# Patient Record
Sex: FEMALE | Race: BLACK OR AFRICAN AMERICAN | NOT HISPANIC OR LATINO | Employment: FULL TIME | URBAN - METROPOLITAN AREA
[De-identification: names, ages, dates, MRNs, and addresses within clinical notes are randomized per-mention and may not be internally consistent; named-entity substitution may affect disease eponyms.]

---

## 2018-06-11 ENCOUNTER — APPOINTMENT (OUTPATIENT)
Dept: RADIOLOGY | Facility: MEDICAL CENTER | Age: 38
End: 2018-06-11
Attending: EMERGENCY MEDICINE
Payer: COMMERCIAL

## 2018-06-11 ENCOUNTER — HOSPITAL ENCOUNTER (EMERGENCY)
Facility: MEDICAL CENTER | Age: 38
End: 2018-06-11
Attending: EMERGENCY MEDICINE
Payer: COMMERCIAL

## 2018-06-11 VITALS
HEIGHT: 59 IN | DIASTOLIC BLOOD PRESSURE: 61 MMHG | HEART RATE: 73 BPM | OXYGEN SATURATION: 96 % | SYSTOLIC BLOOD PRESSURE: 98 MMHG | WEIGHT: 165 LBS | RESPIRATION RATE: 16 BRPM | BODY MASS INDEX: 33.26 KG/M2 | TEMPERATURE: 97.6 F
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
## 2018-06-11 DIAGNOSIS — M54.50 ACUTE MIDLINE LOW BACK PAIN WITHOUT SCIATICA: ICD-10-CM

## 2018-06-11 PROCEDURE — 72100 X-RAY EXAM L-S SPINE 2/3 VWS: CPT

## 2018-06-11 PROCEDURE — 99284 EMERGENCY DEPT VISIT MOD MDM: CPT

## 2018-06-11 PROCEDURE — A9270 NON-COVERED ITEM OR SERVICE: HCPCS | Performed by: EMERGENCY MEDICINE

## 2018-06-11 PROCEDURE — 700102 HCHG RX REV CODE 250 W/ 637 OVERRIDE(OP): Performed by: EMERGENCY MEDICINE

## 2018-06-11 RX ORDER — IBUPROFEN 600 MG/1
600 TABLET ORAL ONCE
Status: COMPLETED | OUTPATIENT
Start: 2018-06-11 | End: 2018-06-11

## 2018-06-11 RX ORDER — ACETAMINOPHEN 325 MG/1
650 TABLET ORAL ONCE
Status: COMPLETED | OUTPATIENT
Start: 2018-06-11 | End: 2018-06-11

## 2018-06-11 RX ADMIN — IBUPROFEN 600 MG: 600 TABLET, FILM COATED ORAL at 20:30

## 2018-06-11 RX ADMIN — ACETAMINOPHEN 650 MG: 325 TABLET, FILM COATED ORAL at 20:30

## 2018-06-11 ASSESSMENT — PAIN SCALES - GENERAL
PAINLEVEL_OUTOF10: 5
PAINLEVEL_OUTOF10: 9

## 2018-06-12 NOTE — ED TRIAGE NOTES
Pt is not a fall risk per [employer] scale.  Provided call light and instructions to call for assistance.  Pt verbalizes understanding.
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
## 2019-03-05 ENCOUNTER — OFFICE VISIT (OUTPATIENT)
Dept: URGENT CARE | Facility: PHYSICIAN GROUP | Age: 39
End: 2019-03-05
Payer: COMMERCIAL

## 2019-03-05 VITALS
WEIGHT: 151 LBS | HEIGHT: 59 IN | TEMPERATURE: 98.4 F | OXYGEN SATURATION: 96 % | HEART RATE: 78 BPM | BODY MASS INDEX: 30.44 KG/M2 | DIASTOLIC BLOOD PRESSURE: 80 MMHG | RESPIRATION RATE: 14 BRPM | SYSTOLIC BLOOD PRESSURE: 132 MMHG

## 2019-03-05 DIAGNOSIS — G56.03 BILATERAL CARPAL TUNNEL SYNDROME: ICD-10-CM

## 2019-03-05 PROCEDURE — 99204 OFFICE O/P NEW MOD 45 MIN: CPT | Performed by: FAMILY MEDICINE

## 2019-03-05 NOTE — LETTER
March 5, 2019         Patient: Nav Coates   YOB: 1980   Date of Visit: 3/5/2019           To Whom it May Concern:    Nav Coates was seen in my clinic on 3/5/2019.      If you have any questions or concerns, please don't hesitate to call.        Sincerely,           Johnny Burgess M.D.  Electronically Signed

## 2019-03-06 NOTE — PROGRESS NOTES
"Chief Complaint   Patient presents with   • Hand Pain     bilat wrist/hand pain x4 months     Subjective:                hand pain          c/o intermittent sharp bilat hand and wrist pain x 4 mth.   Pain is worse with use of the hands.   Also c/o \"tingling\" in both hands ,worse at night.   She is taking tylenol, which has not been helpful.    Also c/o dec  strength           The pain does not radiate. The pain is moderate.           Past medical history was unremarkable and not pertinent to current issue  Social hx - denies tobacco, alcohol, drug use  Family hx was reviewed - no pertinent past family hx          Review of Systems   Constitutional: Negative for fever, chills and malaise/fatigue.   Eyes: Negative for vision changes, d/c.    Respiratory: Negative for cough and sputum production.    Cardiovascular: Negative for chest pain and palpitations.   Gastrointestinal: Negative for nausea, vomiting, abdominal pain, diarrhea and constipation.   Genitourinary: Negative for dysuria, urgency and frequency.   Skin: Negative for rash or  itching.   Neurological: Negative for dizziness or headaches.   Psychiatric/Behavioral: Negative for depression.   Hematologic/lymphatic - denies bruising or excessive bleeding  All other systems reviewed and are negative.         Objective:     Blood pressure 132/80, pulse 78, temperature 36.9 °C (98.4 °F), temperature source Temporal, resp. rate 14, height 1.499 m (4' 11\"), weight 68.5 kg (151 lb), SpO2 96 %.      Physical Exam   Constitutional: pt is oriented to person, place, and time. Pt appears well-developed and well-nourished. No distress.   HENT:   Head: Normocephalic and atraumatic.   Eyes: Conjunctivae are normal.   Cardiovascular: Normal rate.    Pulmonary/Chest: Effort normal.   Musculoskeletal:        bilat hands:   There is no thenar wasting.   No objective sensory loss.    strength 4/5 bilat.         normal range of motion and no crepitus.     Neurological: pt " is alert and oriented to person, place, and time.   Skin: Skin is warm. Pt is not diaphoretic. No erythema.   Nursing note and vitals reviewed.              Assessment/Plan:        1. Bilateral carpal tunnel syndrome     - REFERRAL TO NEURODIAGNOSTICS (EEG,EP,EMG/NCS/DBS) Modality Requested: EMG-Comment Extremities  - Diclofenac Sodium (VOLTAREN) 1 % Gel; Apply 4 g to skin as directed 3 times a day as needed.  Dispense: 1 Tube; Refill: 0  - REFERRAL TO HAND SURGERY

## 2019-07-18 ENCOUNTER — OCCUPATIONAL MEDICINE (OUTPATIENT)
Dept: URGENT CARE | Facility: PHYSICIAN GROUP | Age: 39
End: 2019-07-18
Payer: COMMERCIAL

## 2019-07-18 VITALS
HEIGHT: 59 IN | HEART RATE: 71 BPM | TEMPERATURE: 98 F | SYSTOLIC BLOOD PRESSURE: 118 MMHG | BODY MASS INDEX: 33.43 KG/M2 | DIASTOLIC BLOOD PRESSURE: 84 MMHG | OXYGEN SATURATION: 97 % | WEIGHT: 165.8 LBS | RESPIRATION RATE: 14 BRPM

## 2019-07-18 DIAGNOSIS — S46.919A REPETITIVE STRAIN INJURY OF UPPER EXTREMITY: Primary | ICD-10-CM
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
## 2019-07-18 DIAGNOSIS — G56.03 BILATERAL CARPAL TUNNEL SYNDROME: ICD-10-CM

## 2019-07-18 PROCEDURE — 99214 OFFICE O/P EST MOD 30 MIN: CPT | Performed by: NURSE PRACTITIONER

## 2019-07-18 RX ORDER — PREDNISONE 20 MG/1
TABLET ORAL
Qty: 14 TAB | Refills: 0 | Status: SHIPPED | OUTPATIENT
Start: 2019-07-18 | End: 2020-01-22

## 2019-07-18 ASSESSMENT — ENCOUNTER SYMPTOMS
RESPIRATORY NEGATIVE: 1
TINGLING: 1
FOCAL WEAKNESS: 0
CONSTITUTIONAL NEGATIVE: 1
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
## 2019-07-18 NOTE — LETTER
"EMPLOYEE’S CLAIM FOR COMPENSATION/ REPORT OF INITIAL TREATMENT  FORM C-4    EMPLOYEE’S CLAIM - PROVIDE ALL INFORMATION REQUESTED   First Name  Nav Last Name  Aminata Birthdate                    1980                Sex  female Claim Number   Home Address  [address] Age  39 y.o. Height  1.499 m (4' 11\") Weight  75.2 kg (165 lb 12.8 oz) [facility]     [facility] Zip  [address] Telephone  [phone] (home)    Mailing Address  [address] [place] Zip  [address] Primary Language Spoken  English    Insurer  [employer]   [employer]   Employee's Occupation (Job Title) When Injury or Occupational Disease Occurred      Employer's Name  [employer]  Telephone  [phone]    Employer Address  [address]  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 (Be specific an answer in detail. Use additional sheet if necessary)  repetition in pulling, grasping, bending copper wire   If you believe that you have an occupational disease, when did you first have knowledge of the disability and it relationship to your employment?  n/a Witnesses to the Accident  n/a      Nature of Injury or Occupational Disease  Workers' Compensation  Part(s) of Body Injured or Affected  Wrist (L) and Hand (L), Wrist (R) and Hand (R),     I certify that the above is true and correct to the best of my knowledge and that I have provided this information in order to obtain the benefits of " [place]  I hereby authorize any physician, chiropractor, surgeon, practitioner, or other person, any hospital, including [facility] or [facility], any medical service organization, any insurance company, or other institution or organization to release to each other, any medical or other information, including benefits paid or payable, pertinent to this injury or disease, except information relative to diagnosis, treatment and/or counseling for AIDS, psychological conditions, alcohol or controlled substances, for which I must give specific authorization.  A Photostat of this authorization shall be as valid as the original.     Date   Place   Employee’s Signature   THIS REPORT MUST BE COMPLETED AND MAILED WITHIN 3 WORKING DAYS OF TREATMENT   Place  [facility]  Name of Facility  [facility]   Date  7/18/2019 Diagnosis  (S46.919A) Repetitive strain injury of upper extremity  (G56.03) Bilateral carpal tunnel syndrome Is there evidence the injured employee was under the influence of alcohol and/or another controlled substance at the time of accident?   Hour  9:29 AM Description of Injury or Disease  Diagnoses of Repetitive strain injury of upper extremity and Bilateral carpal tunnel syndrome were pertinent to this visit.     Treatment  Initial visit.  Rx for prednisone.  Advised on RICE and OTC acetaminophen.  Avoid NSAIDs while on steroids.  Released to restricted duty.  Follow-up in 4 days, return sooner if symptoms worsen.  Have you advised the patient to remain off work five days or more? No   X-Ray Findings      If Yes   From Date  To Date      From information given by the employee, together with medical evidence, can you directly connect this injury or occupational disease as job incurred?    Comments:Indeterminate If No Full Duty  No Modified Duty  Yes   Is additional  "medical care by a physician indicated?  Yes If Modified Duty, Specify any Limitations / Restrictions  Per D39   Do you know of any previous injury or disease contributing to this condition or occupational disease?                              Comments:Dx carpal tunnel after initiating work   Date  7/18/2019 Print Doctor’s Name SUNITHA Shahid certify the employer’s copy of  this form was mailed on:   Address  [address] Insurer’s Use Only     [facility] Zip  [address]    Provider’s Tax ID Number  [id number] Telephone  Dept: [phone]        e-MAYRA Gamino   e-Signature: Dr. Hector Ni, Medical Director Degree  APRN        ORIGINAL-TREATING PHYSICIAN OR CHIROPRACTOR    PAGE 2-INSURER/TPA    PAGE 3-EMPLOYER    PAGE 4-EMPLOYEE             Form C-4 (rev10/07)              BRIEF DESCRIPTION OF RIGHTS AND BENEFITS  (Pursuant to [place]RS 616C.050)    Notice of Injury or Occupational Disease (Incident Report Form C-1): If an injury or occupational disease (OD) arises out of and in the  course of employment, you must provide written notice to your employer as soon as practicable, but no later than 7 days after the accident or  OD. Your employer shall maintain a sufficient supply of the required forms.    Claim for Compensation (Form C-4): If medical treatment is sought, the form C-4 is available at the place of initial treatment. A completed  \"Claim for Compensation\" (Form C-4) must be filed within 90 days after an accident or OD. The treating physician or chiropractor must,  within 3 working days after treatment, complete and mail to the employer, the employer's insurer and third-party , the Claim for  Compensation.    Medical Treatment: If you require medical treatment for your on-the-job injury or OD, you may be required to select a physician or  chiropractor from a list provided by your workers’ compensation insurer, if it has contracted with " an Organization for Managed Care (MCO) or  Preferred Provider Organization (PPO) or providers of health care. If your employer has not entered into a contract with an MCO or PPO, you  may select a physician or chiropractor from the Panel of Physicians and Chiropractors. Any medical costs related to your industrial injury or  OD will be paid by your insurer.    Temporary Total Disability (TTD): If your doctor has certified that you are unable to work for a period of at least 5 consecutive days, or 5  cumulative days in a 20-day period, or places restrictions on you that your employer does not accommodate, you may be entitled to TTD  compensation.    Temporary Partial Disability (TPD): If the wage you receive upon reemployment is less than the compensation for TTD to which you are  entitled, the insurer may be required to pay you TPD compensation to make up the difference. TPD can only be paid for a maximum of 24  months.    Permanent Partial Disability (PPD): When your medical condition is stable and there is an indication of a PPD as a result of your injury or  OD, within 30 days, your insurer must arrange for an evaluation by a rating physician or chiropractor to determine the degree of your PPD. The  amount of your PPD award depends on the date of injury, the results of the PPD evaluation and your age and wage.    Permanent Total Disability (PTD): If you are medically certified by a treating physician or chiropractor as permanently and totally disabled  and have been granted a PTD status by your insurer, you are entitled to receive monthly benefits not to exceed 66 2/3% of your average  monthly wage. The amount of your PTD payments is subject to reduction if you previously received a PPD award.    Vocational Rehabilitation Services: You may be eligible for vocational rehabilitation services if you are unable to return to the job due to a  permanent physical impairment or permanent restrictions as a result of your  injury or occupational disease.    Transportation and Per Tigre Reimbursement: You may be eligible for travel expenses and per tigre associated with medical treatment.    Reopening: You may be able to reopen your claim if your condition worsens after claim closure.    Appeal Process: If you disagree with a written determination issued by the insurer or the insurer does not respond to your request, you may  appeal to the Department of Administration, , by following the instructions contained in your determination letter. You must  appeal the determination within 70 days from the date of the determination letter at [address], [address], [place], [place]  [address], or [address], [address], [place], [place] [address]. If you disagree with the  decision, you may appeal to the  Department of Administration, . You must file your appeal within 30 days from the date of the  decision  letter at [address], [address], [place], [place] [address], or [address] [facility] [address], [place] [address]. If you  disagree with a decision of an , you may file a petition for judicial review with the District Court. You must do so within 30  days of the Appeal Officer’s decision. You may be represented by an  at your own expense or you may contact the [facility] for possible  representation.    [place]  for Injured Workers (NAIW): If you disagree with a  decision, you may request that NAIW represent you  without charge at an  Hearing. For information regarding denial of benefits, you may contact the [facility] at: [address], [address], [place] [address], [phone], or [address], [place] [address], [phone]    To File a Complaint with the Division: If you wish to file a complaint with the  of the Division of Industrial Relations  (DIR),  please contact the Workers’ Compensation Section, [address], [address], [place], [place] [address], telephone [phone]) [phone], or  [address], [address], [place], [place] [address], telephone [phone].    For assistance with Workers’ Compensation Issues: you may contact the Office of the [facility] Consumer Health Assistance, [address] [place] [address], [address], [place], [place] [address], Toll Free 1-757.179.4733, Web site: http://govcha.Critical access hospital.nv., E-mail  [email or web address] access [email or web address].                                                                                                                                                                                                                                   __________________________________________________________________                                                                   _________________                Employee Name / Signature                                                                                                                                                       Date                                                                                                                                                                                                     D-2 (rev. 10/07)

## 2019-07-18 NOTE — PROGRESS NOTES
"Subjective:     Nav Coates is a 39 y.o. female who presents for Wrist Pain (Bilateral)    DOI: 7/16/2019    Initial visit.  Patient works as a  with [employer].  Patient reports left wrist/hand and right wrist/hand pain, numbness, and tingling which has gradually been getting worse since last fall.  Patient reports she was diagnosed with bilateral carpal tunnel syndrome.  She states she has had positive EMG testing and has seen a hand surgeon who recommended surgery.  Prior therapy: Night splints, Voltaren gel, NSAIDs with minimal relief in the pain.  Patient states she has worked for [employer] since June 2018.  Denies second job.  Denies previous injury.    PMH: No pertinent past medical history to this problem except as documented in \[facility] [place] [facility]\""  MEDS: Medications were reviewed in Epic  ALLERGIES: Allergies were reviewed in Epic  SOCHX: Works as a  at [employer]   FH: No pertinent family history to this problem    Review of Systems   Constitutional: Negative.    Respiratory: Negative.    Musculoskeletal:        Bilateral hand/wrist pain   Neurological: Positive for tingling. Negative for focal weakness.        Numbness bilateral hand/wrists   All other systems reviewed and are negative.    Objective:     /84   Pulse 71   Temp 36.7 °C (98 °F)   Resp 14   Ht 1.499 m (4' 11\")   Wt 75.2 kg (165 lb 12.8 oz)   SpO2 97%   BMI 33.49 kg/m²     Physical Exam   Constitutional: She is oriented to person, place, and time. She appears well-developed and well-nourished.  Non-toxic appearance. No distress.   HENT:   Right Ear: External ear normal.   Left Ear: External ear normal.   Nose: Nose normal.   Eyes: Conjunctivae and EOM are normal.   Neck: Normal range of motion.   Cardiovascular: Normal rate and normal pulses.    Pulmonary/Chest: Effort normal. No respiratory distress.   Musculoskeletal: She exhibits no deformity.        Right wrist: She exhibits decreased range of motion and tenderness. " She exhibits no swelling, no deformity and no laceration.        Left wrist: She exhibits decreased range of motion and tenderness. She exhibits no swelling, no deformity and no laceration.        Right hand: She exhibits normal range of motion, no tenderness, normal capillary refill, no deformity, no laceration and no swelling. Decreased sensation noted. Normal strength noted.        Left hand: She exhibits normal range of motion, no tenderness, normal capillary refill, no deformity, no laceration and no swelling. Decreased sensation noted. Normal strength noted.   Positive Tinel and Phalen test bilaterally   Neurological: She is alert and oriented to person, place, and time. Coordination normal.   Hand  strong, equal   Skin: Skin is warm and dry. Capillary refill takes less than 2 seconds.   Psychiatric: She has a normal mood and affect. Her behavior is normal.     Assessment/Plan:     1. Repetitive strain injury of upper extremity    2. Bilateral carpal tunnel syndrome  - predniSONE (DELTASONE) 20 MG Tab; Take 3 tabs daily x 3 days, then 2 tabs daily x 2 days, then 1 tab on final day.  Dispense: 14 Tab; Refill: 0    Initial visit.  Rx for prednisone.  Advised on RICE and OTC acetaminophen.  Avoid NSAIDs while on steroids.  Released to restricted duty.  Follow-up in 4 days, return sooner if symptoms worsen.    Patient advised to: Return in about 4 days (around 7/22/2019).    Differential diagnosis, natural history, supportive care, and indications for immediate follow-up discussed. All questions answered. Patient agrees with the plan of care.

## 2019-07-22 ENCOUNTER — OCCUPATIONAL MEDICINE (OUTPATIENT)
Dept: URGENT CARE | Facility: PHYSICIAN GROUP | Age: 39
End: 2019-07-22
Payer: COMMERCIAL

## 2019-07-22 VITALS
BODY MASS INDEX: 33.33 KG/M2 | HEART RATE: 71 BPM | SYSTOLIC BLOOD PRESSURE: 110 MMHG | TEMPERATURE: 98.4 F | OXYGEN SATURATION: 98 % | WEIGHT: 165 LBS | RESPIRATION RATE: 16 BRPM | DIASTOLIC BLOOD PRESSURE: 82 MMHG
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
## 2019-07-22 DIAGNOSIS — S46.919A REPETITIVE STRAIN INJURY OF UPPER EXTREMITY: ICD-10-CM

## 2019-07-22 DIAGNOSIS — G56.03 BILATERAL CARPAL TUNNEL SYNDROME: ICD-10-CM

## 2019-07-22 PROCEDURE — 99213 OFFICE O/P EST LOW 20 MIN: CPT | Performed by: NURSE PRACTITIONER

## 2019-07-22 ASSESSMENT — ENCOUNTER SYMPTOMS
TINGLING: 1
CONSTITUTIONAL NEGATIVE: 1

## 2019-07-22 NOTE — LETTER
"   [facility] [address] [place] [address]  Phone:  [phone] - Fax:  [phone]   [facility] Progress Report and Disability Certification  Date of Service: 7/22/2019   No Show:  No  Date / Time of Next Visit: 8/5/2019   Claim Information   Patient Name: Nav Coates  Claim Number:     Employer: [employer]  Date of Injury: 11/18/2018     Insurer / TPA: [employer]  ID / SSN:     Occupation:   Diagnosis: Diagnoses of Bilateral carpal tunnel syndrome and Repetitive strain injury of upper extremity were pertinent to this visit.    Medical Information   Related to Industrial Injury? Yes    Subjective Complaints:  Initial visit in  7/18/2019:    \"DOI: 7/16/2019     Initial visit.  Patient works as a  with [employer].  Patient reports left   wrist/hand and right wrist/hand pain, numbness, and tingling which has gradually been   getting worse since last fall.  Patient reports she was diagnosed with bilateral carpal   tunnel syndrome.  She states she has had positive EMG testing and has seen a hand surgeon   who recommended surgery.  Prior therapy: Night splints, Voltaren gel, NSAIDs with minimal   relief in the pain.  Patient states she has worked for [employer] since June 2018.  Denies   second job.  Denies previous injury.\"    Follow-up visit today 7/22/2019:    Patient reports symptoms as unchanged.  Has not used prescription prednisone from previous   visit as she has been out of town but has been using acetaminophen.  She reports that both   of her hands and fingers are swollen.   Objective Findings: Musculoskeletal: She exhibits no deformity.        Right wrist: She exhibits decreased range of motion and tenderness.        Left wrist: She exhibits decreased range of motion and tenderness.        Right hand: She exhibits decreased range of motion and swelling. She exhibits normal capillary refill. Decreased " sensation noted.        Left hand: She exhibits decreased range of motion and swelling. She exhibits normal capillary refill. Decreased sensation noted.   Positive Tinel and Phalen test bilaterally   Skin: Skin is warm and dry. Capillary refill takes less than 2 seconds.   Pre-Existing Condition(s):     Assessment:   Condition Worsened    Status: Discharged / Care Transfer  Permanent Disability:No    Plan:      Diagnostics:      Comments:  Patient condition worsening.  Care transferred to occupational medicine.  Advised to   initiate prednisone.  Avoid NSAIDs while on steroids.  Released to restricted duty.    Follow-up with occupational medicine, but return sooner if symptoms worsen  .    Disability Information   Status: Released to Restricted Duty    From:  2019  Through: 2019 Restrictions are: Temporary   Physical Restrictions   Sitting:    Standing:    Stooping:    Bending:      Squatting:    Walking:    Climbin hrs/day Pushin hrs/day   Pullin hrs/day Other:    Reaching Above Shoulder (L):   Reaching Above Shoulder (R):       Reaching Below Shoulder (L):    Reaching Below Shoulder (R):      Not to exceed Weight Limits   Carrying(hrs): 0 Weight Limit(lb):   Lifting(hrs): 0 Weight  Limit(lb):     Comments: Bilateral upper extremities    Repetitive Actions   Hands: i.e. Fine Manipulations from [place] hrs/day   Feet: i.e. Operating Foot Controls:     Driving / Operate Machinery: 0 hrs/day   Physician Name: SUNITHA Shahid Physician Signature: MAYRA Weber e-Signature: Dr. Hector Ni, Medical Director   Clinic Name / Location: [facility] [address] [address] Clinic Phone Number: Dept: [phone]   Appointment Time: 4:40 Pm Visit Start Time: 5:07 PM   Check-In Time:  4:57 Pm Visit Discharge Time: 5:44 Pm    Original-Treating Physician or Chiropractor    Page 2-Insurer/TPA    Page 3-Employer    Page  4-Employee

## 2019-07-23 NOTE — PROGRESS NOTES
"Subjective:     Nav Coates is a 39 y.o. female who presents for Hand Injury (WC hand injury both, feeling the same)    Initial visit in  7/18/2019:    \"DOI: 7/16/2019     Initial visit.  Patient works as a  with [employer].  Patient reports left   wrist/hand and right wrist/hand pain, numbness, and tingling which has gradually been   getting worse since last fall.  Patient reports she was diagnosed with bilateral carpal   tunnel syndrome.  She states she has had positive EMG testing and has seen a hand surgeon   who recommended surgery.  Prior therapy: Night splints, Voltaren gel, NSAIDs with minimal   relief in the pain.  Patient states she has worked for [employer] since June 2018.  Denies   second job.  Denies previous injury.\"    Follow-up visit today 7/22/2019:    Patient reports symptoms as unchanged.  Has not used prescription prednisone from previous   visit as she has been out of town but has been using acetaminophen.  She reports that both   of her hands and fingers are swollen.    PMH: No pertinent past medical history to this problem except as documented in [facility]  MEDS: Medications were reviewed in Epic  ALLERGIES: Allergies were reviewed in Epic  SOCHX: Works as a  at [employer]   FH: No pertinent family history to this problem    Review of Systems   Constitutional: Negative.    Musculoskeletal:        Bilateral hand/wrist pain   Neurological: Positive for tingling.        Numbness bilateral hand/wrists   All other systems reviewed and are negative.    Objective:     /82 (BP Location: Left arm, Patient Position: Sitting, BP Cuff Size: Adult)   Pulse 71   Temp 36.9 °C (98.4 °F) (Temporal)   Resp 16   Wt 74.8 kg (165 lb)   SpO2 98%   BMI 33.33 kg/m²     Physical Exam   Constitutional: She is oriented to person, place, and time. She appears well-developed and well-nourished.  Non-toxic appearance. No distress.   HENT:   Right Ear: External ear normal.   Left Ear: " External ear normal.   Nose: Nose normal.   Eyes: Conjunctivae and EOM are normal.   Neck: Normal range of motion.   Cardiovascular: Normal rate and normal pulses.    Pulmonary/Chest: Effort normal. No respiratory distress.   Musculoskeletal: She exhibits no deformity.        Right wrist: She exhibits decreased range of motion and tenderness.        Left wrist: She exhibits decreased range of motion and tenderness.        Right hand: She exhibits decreased range of motion and swelling. She exhibits normal capillary refill. Decreased sensation noted.        Left hand: She exhibits decreased range of motion and swelling. She exhibits normal capillary refill. Decreased sensation noted.   Positive Tinel and Phalen test bilaterally   Neurological: She is alert and oriented to person, place, and time. Coordination normal.   Skin: Skin is warm and dry. Capillary refill takes less than 2 seconds.   Psychiatric: She has a normal mood and affect. Her behavior is normal.     Assessment/Plan:     1. Bilateral carpal tunnel syndrome  - REFERRAL TO OCCUPATIONAL MEDICINE    2. Repetitive strain injury of upper extremity  - REFERRAL TO OCCUPATIONAL MEDICINE    Patient condition worsening.  Care transferred to occupational medicine.  Advised to   initiate prednisone.  Avoid NSAIDs while on steroids.  Released to restricted duty.    Follow-up with occupational medicine, but return sooner if symptoms worsen  .    Patient advised to: Return for 1) Symptoms don't improve or worsen, or go to ER.    Differential diagnosis, natural history, supportive care, and indications for immediate follow-up discussed. All questions answered. Patient agrees with the plan of care.

## 2019-07-31 ENCOUNTER — TELEPHONE (OUTPATIENT)
Dept: OCCUPATIONAL MEDICINE | Facility: CLINIC | Age: 39
End: 2019-07-31

## 2019-07-31 NOTE — TELEPHONE ENCOUNTER
Reminder call for wc apt with [facility] on 08- ,..... Voicemail box is not set up ... Could not leave message

## 2020-01-10 ENCOUNTER — TELEPHONE (OUTPATIENT)
Dept: SCHEDULING | Facility: IMAGING CENTER | Age: 40
End: 2020-01-10

## 2020-01-22 ENCOUNTER — OFFICE VISIT (OUTPATIENT)
Dept: URGENT CARE | Facility: CLINIC | Age: 40
End: 2020-01-22
Payer: COMMERCIAL

## 2020-01-22 VITALS
DIASTOLIC BLOOD PRESSURE: 62 MMHG | OXYGEN SATURATION: 98 % | WEIGHT: 170 LBS | HEIGHT: 59 IN | SYSTOLIC BLOOD PRESSURE: 104 MMHG | TEMPERATURE: 98.2 F | HEART RATE: 67 BPM | RESPIRATION RATE: 16 BRPM | BODY MASS INDEX: 34.27 KG/M2

## 2020-01-22 DIAGNOSIS — R06.02 SHORTNESS OF BREATH: ICD-10-CM

## 2020-01-22 DIAGNOSIS — J45.909 MILD ASTHMA, UNSPECIFIED WHETHER COMPLICATED, UNSPECIFIED WHETHER PERSISTENT: ICD-10-CM

## 2020-01-22 PROBLEM — G56.03 BILATERAL CARPAL TUNNEL SYNDROME: Status: ACTIVE | Noted: 2019-04-10

## 2020-01-22 PROCEDURE — 99214 OFFICE O/P EST MOD 30 MIN: CPT | Mod: 25 | Performed by: NURSE PRACTITIONER

## 2020-01-22 PROCEDURE — 94640 AIRWAY INHALATION TREATMENT: CPT | Performed by: NURSE PRACTITIONER

## 2020-01-22 PROCEDURE — 93000 ELECTROCARDIOGRAM COMPLETE: CPT | Performed by: NURSE PRACTITIONER

## 2020-01-22 RX ORDER — CITALOPRAM 20 MG/1
20 TABLET ORAL DAILY
COMMUNITY
Start: 2018-07-20 | End: 2020-10-23 | Stop reason: SDUPTHER

## 2020-01-22 RX ORDER — ALBUTEROL SULFATE 2.5 MG/3ML
SOLUTION RESPIRATORY (INHALATION)
COMMUNITY
Start: 2019-06-07 | End: 2020-08-12 | Stop reason: SDUPTHER

## 2020-01-22 RX ORDER — CYCLOBENZAPRINE HCL 10 MG
TABLET ORAL
COMMUNITY
Start: 2018-06-28 | End: 2020-03-13

## 2020-01-22 RX ORDER — METHYLPREDNISOLONE 4 MG/1
TABLET ORAL
Qty: 21 TAB | Refills: 0 | Status: SHIPPED | OUTPATIENT
Start: 2020-01-22 | End: 2020-03-13

## 2020-01-22 RX ORDER — MONTELUKAST SODIUM 10 MG/1
10 TABLET ORAL DAILY
COMMUNITY
Start: 2019-06-07 | End: 2020-03-13 | Stop reason: SINTOL

## 2020-01-22 RX ORDER — IPRATROPIUM BROMIDE AND ALBUTEROL SULFATE 2.5; .5 MG/3ML; MG/3ML
3 SOLUTION RESPIRATORY (INHALATION) ONCE
Status: COMPLETED | OUTPATIENT
Start: 2020-01-22 | End: 2020-01-22

## 2020-01-22 RX ADMIN — IPRATROPIUM BROMIDE AND ALBUTEROL SULFATE 3 ML: 2.5; .5 SOLUTION RESPIRATORY (INHALATION) at 20:31

## 2020-01-22 ASSESSMENT — ENCOUNTER SYMPTOMS
TROUBLE SWALLOWING: 0
DYSPNEA ON EXERTION: 0
WHEEZING: 1
CHILLS: 0
RHINORRHEA: 0
SORE THROAT: 0
FEVER: 0
CHEST TIGHTNESS: 1
APPETITE CHANGE: 0
NAUSEA: 0
MYALGIAS: 0
DIZZINESS: 0
VOMITING: 0
PND: 0
SHORTNESS OF BREATH: 1
COUGH: 1
HOARSE VOICE: 0
WEIGHT LOSS: 0
DIFFICULTY BREATHING: 0
EYE PAIN: 0

## 2020-01-22 NOTE — LETTER
January 22, 2020         Patient: Nav Coates   YOB: 1980   Date of Visit: 1/22/2020           To Whom it May Concern:    Nav Coates was seen in my clinic on 1/22/2020. She may return to work on 1/23/2020.    If you have any questions or concerns, please don't hesitate to call.        Sincerely,           SUNITHA Sotelo  Electronically Signed

## 2020-01-23 NOTE — PROGRESS NOTES
"Subjective:   Nav Coates  is a 39 y.o. female who presents for Chest Pain (x 1 week hurts to breath inhaler is helping. Pain is not consistant. Hx of asthma )        Asthma   She complains of chest tightness, cough, shortness of breath and wheezing. There is no difficulty breathing or hoarse voice. This is a new problem. The current episode started in the past 7 days. The problem occurs constantly. The problem has been gradually worsening. The cough is non-productive. Associated symptoms include chest pain (with breathing). Pertinent negatives include no appetite change, dyspnea on exertion, fever, myalgias, nasal congestion, PND, postnasal drip, rhinorrhea, sneezing, sore throat, trouble swallowing or weight loss. Her symptoms are aggravated by occupational exposure. Her symptoms are alleviated by nothing. She reports no improvement on treatment. Her symptoms are not alleviated by beta-agonist. There are no known risk factors for lung disease. Her past medical history is significant for asthma.     Review of Systems   Constitutional: Negative for appetite change, chills, fever and weight loss.   HENT: Negative for hoarse voice, postnasal drip, rhinorrhea, sneezing, sore throat and trouble swallowing.    Eyes: Negative for pain.   Respiratory: Positive for cough, shortness of breath and wheezing.    Cardiovascular: Positive for chest pain (with breathing). Negative for dyspnea on exertion and PND.   Gastrointestinal: Negative for nausea and vomiting.   Genitourinary: Negative for hematuria.   Musculoskeletal: Negative for myalgias.   Skin: Negative for rash.   Neurological: Negative for dizziness.     No Known Allergies   Objective:   /62   Pulse 67   Temp 36.8 °C (98.2 °F)   Resp 16   Ht 1.499 m (4' 11\")   Wt 77.1 kg (170 lb)   SpO2 98%   BMI 34.34 kg/m²   Physical Exam  Vitals signs and nursing note reviewed.   Constitutional:       General: She is not in acute distress.     Appearance: She is " well-developed.   HENT:      Head: Normocephalic and atraumatic.      Right Ear: External ear normal.      Left Ear: External ear normal.      Nose: Nose normal.      Mouth/Throat:      Mouth: Mucous membranes are moist.   Eyes:      Conjunctiva/sclera: Conjunctivae normal.      Pupils: Pupils are equal, round, and reactive to light.   Cardiovascular:      Rate and Rhythm: Normal rate and regular rhythm.      Chest Wall: PMI is not displaced.      Pulses: Normal pulses.      Heart sounds: Normal heart sounds, S1 normal and S2 normal. No murmur.   Pulmonary:      Effort: Pulmonary effort is normal. No accessory muscle usage or respiratory distress.      Breath sounds: Wheezing present.   Abdominal:      General: There is no distension.      Palpations: Abdomen is soft.      Tenderness: There is no tenderness.   Musculoskeletal: Normal range of motion.   Skin:     General: Skin is warm and dry.   Neurological:      General: No focal deficit present.      Mental Status: She is alert and oriented to person, place, and time. Mental status is at baseline.      Gait: Gait (gait at baseline) normal.   Psychiatric:         Judgment: Judgment normal.           Assessment/Plan:   1. Mild asthma, unspecified whether complicated, unspecified whether persistent  - ipratropium-albuterol (DUONEB) nebulizer solution  - methylPREDNISolone (MEDROL DOSEPAK) 4 MG Tablet Therapy Pack; Follow schedule on package instructions.  Dispense: 21 Tab; Refill: 0    2. Shortness of breath  - ipratropium-albuterol (DUONEB) nebulizer solution  - EKG - Clinic Performed  Interpreted by me    Rate: Normal  Rhythm: Normal sinus   Axis: Normal  Interval/Conduction: Normal  Enlargement: None    Ischemia:      ST Segments: no acute change      T Waves: no acute change      Q Waves: none    Comparison: NONE    Clinical Impression: no acute changes and normal EKG, see scanned sheet  Lung sounds improved with breathing treatment. PO2 reassessed and adequate.      Patient and/or guardian given precautionary s/sx that mandate immediate follow up and evaluation in the ED. Advised of risks of not doing so.  Side effects of the above medications discussed.   DDX, Supportive care, and indications for immediate follow-up discussed with patient.    Instructed to return to clinic or nearest emergency department if we are not available for any change in condition, further concerns, or worsening of symptoms.    The patient and/or guardian demonstrated a good understanding and agreed with the treatment plan.    Please note that this dictation was created using voice recognition software. I have made every reasonable attempt to correct obvious errors, but I expect that there are errors of grammar and possibly content that I did not discover before finalizing the note.

## 2020-02-12 ENCOUNTER — TELEPHONE (OUTPATIENT)
Dept: SCHEDULING | Facility: IMAGING CENTER | Age: 40
End: 2020-02-12

## 2020-03-13 ENCOUNTER — OFFICE VISIT (OUTPATIENT)
Dept: MEDICAL GROUP | Facility: PHYSICIAN GROUP | Age: 40
End: 2020-03-13
Payer: COMMERCIAL

## 2020-03-13 VITALS
HEART RATE: 64 BPM | SYSTOLIC BLOOD PRESSURE: 110 MMHG | HEIGHT: 59 IN | RESPIRATION RATE: 16 BRPM | BODY MASS INDEX: 34.35 KG/M2 | TEMPERATURE: 98.2 F | OXYGEN SATURATION: 97 % | WEIGHT: 170.4 LBS | DIASTOLIC BLOOD PRESSURE: 78 MMHG

## 2020-03-13 DIAGNOSIS — Z23 NEED FOR VACCINATION: ICD-10-CM

## 2020-03-13 DIAGNOSIS — F90.0 ATTENTION DEFICIT HYPERACTIVITY DISORDER (ADHD), PREDOMINANTLY INATTENTIVE TYPE: ICD-10-CM

## 2020-03-13 DIAGNOSIS — F33.42 MAJOR DEPRESSIVE DISORDER, RECURRENT EPISODE, IN FULL REMISSION (HCC): ICD-10-CM

## 2020-03-13 DIAGNOSIS — J45.40 MODERATE PERSISTENT ASTHMA WITHOUT COMPLICATION: Primary | ICD-10-CM

## 2020-03-13 DIAGNOSIS — Z12.31 ENCOUNTER FOR SCREENING MAMMOGRAM FOR MALIGNANT NEOPLASM OF BREAST: ICD-10-CM

## 2020-03-13 PROBLEM — G56.03 BILATERAL CARPAL TUNNEL SYNDROME: Status: RESOLVED | Noted: 2019-04-10 | Resolved: 2020-03-13

## 2020-03-13 PROCEDURE — 90686 IIV4 VACC NO PRSV 0.5 ML IM: CPT | Performed by: FAMILY MEDICINE

## 2020-03-13 PROCEDURE — 99214 OFFICE O/P EST MOD 30 MIN: CPT | Mod: 25 | Performed by: FAMILY MEDICINE

## 2020-03-13 PROCEDURE — 90471 IMMUNIZATION ADMIN: CPT | Performed by: FAMILY MEDICINE

## 2020-03-13 RX ORDER — ALBUTEROL SULFATE 90 UG/1
2 AEROSOL, METERED RESPIRATORY (INHALATION) EVERY 4 HOURS PRN
Qty: 2 INHALER | Refills: 3 | Status: SHIPPED | OUTPATIENT
Start: 2020-03-13 | End: 2021-06-21

## 2020-03-13 RX ORDER — FLUTICASONE FUROATE 100 UG/1
1 POWDER RESPIRATORY (INHALATION) DAILY
Qty: 1 EACH | Refills: 1 | Status: SHIPPED | OUTPATIENT
Start: 2020-03-13 | End: 2020-08-12

## 2020-03-13 ASSESSMENT — PATIENT HEALTH QUESTIONNAIRE - PHQ9
8. MOVING OR SPEAKING SO SLOWLY THAT OTHER PEOPLE COULD HAVE NOTICED. OR THE OPPOSITE, BEING SO FIGETY OR RESTLESS THAT YOU HAVE BEEN MOVING AROUND A LOT MORE THAN USUAL: NOT AT ALL
9. THOUGHTS THAT YOU WOULD BE BETTER OFF DEAD, OR OF HURTING YOURSELF: NOT AT ALL
5. POOR APPETITE OR OVEREATING: NOT AT ALL
3. TROUBLE FALLING OR STAYING ASLEEP OR SLEEPING TOO MUCH: NOT AT ALL
SUM OF ALL RESPONSES TO PHQ9 QUESTIONS 1 AND 2: 0
7. TROUBLE CONCENTRATING ON THINGS, SUCH AS READING THE NEWSPAPER OR WATCHING TELEVISION: NOT AT ALL
6. FEELING BAD ABOUT YOURSELF - OR THAT YOU ARE A FAILURE OR HAVE LET YOURSELF OR YOUR FAMILY DOWN: NOT AL ALL
SUM OF ALL RESPONSES TO PHQ QUESTIONS 1-9: 0
1. LITTLE INTEREST OR PLEASURE IN DOING THINGS: NOT AT ALL
4. FEELING TIRED OR HAVING LITTLE ENERGY: NOT AT ALL
2. FEELING DOWN, DEPRESSED, IRRITABLE, OR HOPELESS: NOT AT ALL

## 2020-03-13 NOTE — ASSESSMENT & PLAN NOTE
This is a chronic condition. Her symptoms are usually triggered by allergies. Symptoms are only triggered at work because of all the dust.  Onset: Symptoms present since age 5  Symptoms include:  Cough: yes  Wheezing: yes  Details: diffuse  Problems with exercise induced coughing, wheezing, or shortness of breath?  No  Has sleep been disturbed due to symptoms: No  How often have you had to use your albuterol for relief of symptoms?  daily  Current medications: montelukast 10 mg - drowsiness so not taking regularly, albuterol as needed    She has been on QVAR in the past without much relief.

## 2020-03-13 NOTE — ASSESSMENT & PLAN NOTE
This is a chronic condition.  Onset: 2015  PHQ screen: 0  Suicidal ideation/homicidal ideation: no/no  Therapy/counseling: in past, not currently  Medications: citalopram 20 mg daily  Improving/worsening: well-controlled

## 2020-03-13 NOTE — ASSESSMENT & PLAN NOTE
This is a chronic condition. She does get some ADHD but she is able to help herself focus and control it with meditation, deep breathing, self care, and other techniques.

## 2020-04-29 ENCOUNTER — HOSPITAL ENCOUNTER (OUTPATIENT)
Dept: RADIOLOGY | Facility: MEDICAL CENTER | Age: 40
End: 2020-04-29
Payer: COMMERCIAL

## 2020-05-04 ENCOUNTER — HOSPITAL ENCOUNTER (OUTPATIENT)
Dept: RADIOLOGY | Facility: MEDICAL CENTER | Age: 40
End: 2020-05-04
Payer: COMMERCIAL

## 2020-06-04 ENCOUNTER — HOSPITAL ENCOUNTER (OUTPATIENT)
Dept: RADIOLOGY | Facility: MEDICAL CENTER | Age: 40
End: 2020-06-04
Attending: FAMILY MEDICINE
Payer: COMMERCIAL

## 2020-06-04 DIAGNOSIS — Z12.31 ENCOUNTER FOR SCREENING MAMMOGRAM FOR MALIGNANT NEOPLASM OF BREAST: ICD-10-CM

## 2020-06-04 PROCEDURE — 77067 SCR MAMMO BI INCL CAD: CPT

## 2020-06-05 ENCOUNTER — TELEPHONE (OUTPATIENT)
Dept: MEDICAL GROUP | Facility: PHYSICIAN GROUP | Age: 40
End: 2020-06-05

## 2020-06-05 NOTE — TELEPHONE ENCOUNTER
Phone Number Called: [phone] (home)     Call outcome: Did not leave a detailed message. Requested patient to call back.    Message: results

## 2020-06-05 NOTE — TELEPHONE ENCOUNTER
----- Message from Sarah Ramírez M.D. sent at 6/4/2020  3:20 PM PDT -----  Your recent mammogram showed your breasts are heterogeneously dense which may obscure small masses but there were no obvious signs of cancer.  Thank you,  Sarah Ramírez MD

## 2020-06-08 NOTE — TELEPHONE ENCOUNTER
Phone Number Called: [phone] (home)     Call outcome: Spoke to patient regarding message below.    Message: patient understood no questions at this time

## 2020-08-12 ENCOUNTER — OFFICE VISIT (OUTPATIENT)
Dept: MEDICAL GROUP | Facility: PHYSICIAN GROUP | Age: 40
End: 2020-08-12
Payer: COMMERCIAL

## 2020-08-12 ENCOUNTER — NURSE TRIAGE (OUTPATIENT)
Dept: HEALTH INFORMATION MANAGEMENT | Facility: OTHER | Age: 40
End: 2020-08-12

## 2020-08-12 VITALS
TEMPERATURE: 98.2 F | HEIGHT: 59 IN | RESPIRATION RATE: 16 BRPM | SYSTOLIC BLOOD PRESSURE: 112 MMHG | BODY MASS INDEX: 33.43 KG/M2 | WEIGHT: 165.8 LBS | HEART RATE: 74 BPM | DIASTOLIC BLOOD PRESSURE: 80 MMHG | OXYGEN SATURATION: 97 %

## 2020-08-12 DIAGNOSIS — J45.40 MODERATE PERSISTENT ASTHMA WITHOUT COMPLICATION: ICD-10-CM

## 2020-08-12 PROCEDURE — 99214 OFFICE O/P EST MOD 30 MIN: CPT | Performed by: FAMILY MEDICINE

## 2020-08-12 RX ORDER — ALBUTEROL SULFATE 2.5 MG/3ML
2.5 SOLUTION RESPIRATORY (INHALATION) EVERY 4 HOURS PRN
Qty: 30 BULLET | Refills: 2 | Status: SHIPPED | OUTPATIENT
Start: 2020-08-12

## 2020-08-12 RX ORDER — PREDNISONE 20 MG/1
40 TABLET ORAL DAILY
Qty: 10 TAB | Refills: 0 | Status: SHIPPED | OUTPATIENT
Start: 2020-08-12 | End: 2020-08-17

## 2020-08-12 NOTE — TELEPHONE ENCOUNTER
Regarding: SHORTNESS OF BREATH AND WHEEZING  ----- Message from Tj Aleman Ass't sent at 8/12/2020  8:52 AM PDT -----  PT HAS SHORTNESS OF BREATH AND WHEEZING DUE TO ASTHMA.  TRANSFERRED TO NURSE. FREDA

## 2020-08-12 NOTE — TELEPHONE ENCOUNTER
Has nebulizer at home, took 3 vials last night, vials  last month, need new refill, feeling uncomfortable w/SOB.      1. Caller Name: Nav Coates                 Call Back Number: [phone]  [facility] PCP or Specialty Provider: Yes         2.  In the last two weeks, has the patient had any new or worsening symptoms (not explained by alternative diagnosis)? No, since  caller having problems w/her asthma related to change in work environment that is [employer] & need to wear a mask because of covid, all this has contributed to increased problems w/allergies & asthma, had fluid on lung in  & went to  & given breathing treatment & 6 day steroid pack.      3.  Does patient have any comoribidities? Has had allergies & asthma her entire life.      4.  Has the patient traveled in the last 14 days OR had any known contact with someone who is suspected or confirmed to have COVID-19?        Symptoms are not new, allergy & asthma related symptoms.

## 2020-08-12 NOTE — LETTER
August 12, 2020      To Whomever It May Concern RE:  Nav Torrestle    Due to her asthma, she has much difficulty with the dust causing asthma exacerbations. She states the different masks make trouble breathing worse. She has purchased a [employer] ST-AX reusable dust half respirator. This works very well to keep dust out and will limit her asthma exacerbations. While there is no information specifically for COVID for this respirator, it does use a cotton filter, which presumably is similar to the fabric masks being made for this pandemic and will likely provide some protection for COVID. I am requesting you allow her to wear this respirator at work.     Thank you,  Sarah Ramírez M.D.

## 2020-08-12 NOTE — ASSESSMENT & PLAN NOTE
This is a chronic condition. Her asthma is unchanged since our last appointment. Being around the dust causes an asthma exacerbation. She is around dust more often than not while at work. She is not taking the Arnuity Ellipta I prescribed due to cost. She would like to try Dulera instead as that was cost effective in the past. She had been taking singulair and albuterol. She has been using the albuterol 4-5 times/day. She will take 4 puffs at a time. She used the nebulizer the last 2 nights. She does need a refill of the nebulizer albuterol because it has .     She reports she has to wear a mask 12 hours a day. While she wears the mask, with the dust, and fan, she has trouble breathing. She needs a doctor's note stating she has asthma so her work can work with her to find a mask that works better.

## 2020-08-12 NOTE — PROGRESS NOTES
Subjective:     CC: asthma    HPI:   Nav presents today with     Moderate persistent asthma  This is a chronic condition. Her asthma is unchanged since our last appointment. Being around the dust causes an asthma exacerbation. She is around dust more often than not while at work. She is not taking the Arnuity Ellipta I prescribed due to cost. She would like to try Dulera instead as that was cost effective in the past. She had been taking singulair and albuterol. She has been using the albuterol 4-5 times/day. She will take 4 puffs at a time. She used the nebulizer the last 2 nights. She does need a refill of the nebulizer albuterol because it has .     She reports she has to wear a mask 12 hours a day. While she wears the mask, with the dust, and fan, she has trouble breathing. She needs a doctor's note stating she has asthma so her work can work with her to find a mask that works better.       History reviewed. No pertinent past medical history.    Social History     Tobacco Use   • Smoking status: Never Smoker   • Smokeless tobacco: Never Used   Substance Use Topics   • Alcohol use: No   • Drug use: No       Current Outpatient Medications Ordered in Epic   Medication Sig Dispense Refill   • Mometasone Furo-Formoterol Fum 100-5 MCG/ACT Aerosol Inhale 2 Puffs by mouth every 12 hours. 1 Each 4   • albuterol (PROVENTIL) 2.5mg/3ml Nebu Soln solution for nebulization 3 mL by Nebulization route every four hours as needed for Shortness of Breath. 30 Bullet 2   • predniSONE (DELTASONE) 20 MG Tab Take 2 Tabs by mouth every day for 5 days. 10 Tab 0   • albuterol 108 (90 Base) MCG/ACT Aero Soln inhalation aerosol Inhale 2 Puffs by mouth every four hours as needed for Shortness of Breath. 2 Inhaler 3   • citalopram (CELEXA) 20 MG Tab Take 20 mg by mouth every day.       No current [facility]-ordered facility-administered medications on file.        Allergies:  Patient has no known allergies.    Health Maintenance:  "Completed    ROS:  Gen: no fevers/chills  GI: no nausea/vomiting    Objective:     Exam:  /80 (BP Location: Right arm, Patient Position: Sitting, BP Cuff Size: Adult)   Pulse 74   Temp 36.8 °C (98.2 °F) (Temporal)   Resp 16   Ht 1.499 m (4' 11\")   Wt 75.2 kg (165 lb 12.8 oz)   SpO2 97%   BMI 33.49 kg/m²  Body mass index is 33.49 kg/m².    Gen: Alert and oriented, No apparent distress.  Neck: Neck is supple without lymphadenopathy.  Lungs: Normal effort, CTA bilaterally, no wheezes, rhonchi, or rales. Deep breaths induced wheezy coughs.  CV: Regular rate and rhythm. No murmurs, rubs, or gallops.  Ext: No clubbing, cyanosis, edema.      Assessment & Plan:     40 y.o. female with the following -     1. Moderate persistent asthma without complication  This is a chronic condition, uncontrolled.  At her last appointment I prescribed Arnuity Ellipta because she was using albuterol too often.  Her asthma gets exacerbated by all of the dust at work.  Unfortunately, the Arnuity Ellipta was far too expensive so she never picked it up and she did not tell me this so she has been without a daily inhaler for 5 months.  She is still using her albuterol 4-5 times per day and noticed her nebulizer solution have .  Additionally, she did buy respirator to use at work to try to cut out all the dust but her work will not let her use it regarding COVID.  There is no specific data on this respirator regarding COVID but it does use a cloth filter which presumably is similar to the cloth masks being used for the pandemic and likely still provide some protection against COVID.  She is having wheezing on exam today but she takes her albuterol 4-5 times a day so we may have some masked symptoms.  She did get some wheezing coughs with deep breaths so I am worried that she is in the middle of an exacerbation currently and that starting a daily inhaler could worsen symptoms.  -Prednisone 40 mg daily x5 days  -Use albuterol " every 4 hours while on prednisone  -Start Dulera after prednisone is completed  -Letter written to work requesting that she be allowed to use the respirator to help limit exacerbations and symptoms    Return in about 4 weeks (around 9/9/2020) for f/u asthma.    Please note that this dictation was created using voice recognition software. I have made every reasonable attempt to correct obvious errors, but I expect that there are errors of grammar and possibly content that I did not discover before finalizing the note.

## 2020-08-13 RX ORDER — MONTELUKAST SODIUM 10 MG/1
10 TABLET ORAL DAILY
Qty: 30 TAB | Refills: 0 | Status: SHIPPED | OUTPATIENT
Start: 2020-08-13 | End: 2020-09-08

## 2020-08-13 NOTE — TELEPHONE ENCOUNTER
VOICEMAIL  1. Caller Name: Nav Coates                      Call Back Number: [phone] (home)     2. Message: Pt called to request a refill of her Cingular, she thought she had some left and found out she was incorrect.  Also she was told the pharmacy was out of [facility] and asked her to have you send in a new Rx for a different med.

## 2020-08-13 NOTE — TELEPHONE ENCOUNTER
Received request via: Patient    Was the patient seen in the last year in this department? Yes    Does the patient have an active prescription (recently filled or refills available) for medication(s) requested? No     Also the Pharmacy stated they are out of [facility], and would like for you to send in a different inhaler.

## 2020-08-14 NOTE — PROGRESS NOTES
She reports that Dulera is not available at her pharmacy so sent in a prescription for Advair instead.  She will take this as 1 puff every 12 hours.

## 2020-09-08 RX ORDER — MONTELUKAST SODIUM 10 MG/1
TABLET ORAL
Qty: 30 TAB | Refills: 0 | Status: SHIPPED | OUTPATIENT
Start: 2020-09-08 | End: 2021-02-19

## 2020-10-07 ENCOUNTER — NURSE TRIAGE (OUTPATIENT)
Dept: HEALTH INFORMATION MANAGEMENT | Facility: OTHER | Age: 40
End: 2020-10-07

## 2020-10-09 ENCOUNTER — APPOINTMENT (OUTPATIENT)
Dept: MEDICAL GROUP | Facility: PHYSICIAN GROUP | Age: 40
End: 2020-10-09

## 2020-10-21 ENCOUNTER — NURSE TRIAGE (OUTPATIENT)
Dept: HEALTH INFORMATION MANAGEMENT | Facility: OTHER | Age: 40
End: 2020-10-21

## 2020-10-21 NOTE — TELEPHONE ENCOUNTER
----- Message from Antonia Sanders sent at 10/20/2020  5:05 PM PDT -----  Previously had carpal tunnel surgery and was diagnosed with nerve damage. She is now experiencing experiencing tenderness and pain in her left arm, which she attributes to the nerve damage.

## 2020-10-23 ENCOUNTER — OFFICE VISIT (OUTPATIENT)
Dept: MEDICAL GROUP | Facility: PHYSICIAN GROUP | Age: 40
End: 2020-10-23
Payer: COMMERCIAL

## 2020-10-23 VITALS
HEART RATE: 68 BPM | WEIGHT: 167 LBS | SYSTOLIC BLOOD PRESSURE: 100 MMHG | BODY MASS INDEX: 33.67 KG/M2 | DIASTOLIC BLOOD PRESSURE: 78 MMHG | TEMPERATURE: 98.1 F | OXYGEN SATURATION: 98 % | RESPIRATION RATE: 15 BRPM | HEIGHT: 59 IN

## 2020-10-23 DIAGNOSIS — J45.40 MODERATE PERSISTENT ASTHMA WITHOUT COMPLICATION: ICD-10-CM

## 2020-10-23 DIAGNOSIS — F33.42 MAJOR DEPRESSIVE DISORDER, RECURRENT EPISODE, IN FULL REMISSION (HCC): ICD-10-CM

## 2020-10-23 DIAGNOSIS — G56.03 BILATERAL CARPAL TUNNEL SYNDROME: Primary | ICD-10-CM

## 2020-10-23 PROCEDURE — 99214 OFFICE O/P EST MOD 30 MIN: CPT | Performed by: FAMILY MEDICINE

## 2020-10-23 RX ORDER — CITALOPRAM 20 MG/1
20 TABLET ORAL DAILY
Qty: 90 TAB | Refills: 3 | Status: SHIPPED | OUTPATIENT
Start: 2020-10-23 | End: 2021-11-12

## 2020-10-23 NOTE — LETTER
October 23, 2020      To Whomever It May Concern RE:  Ledra Coates    Due to worsening carpal tunnel symptoms, please limit weight lifting to 10 lbs or less.    Thank you,  Sarah Ramírez M.D.

## 2020-10-23 NOTE — ASSESSMENT & PLAN NOTE
This is a chronic condition. She has a h/o carpal tunnel release bilaterally. She was told she had moderate-severe nerve damage going into the left elbow. In the last week she has been having pain in her left inside elbow. She is getting pain & tingling in both hands and notices it when lifting for a long period for time. Now still feels the tingling without lifting. She hasn't been using wrist braces.

## 2020-10-23 NOTE — ASSESSMENT & PLAN NOTE
This is a chronic condition.  Onset: 2015  PHQ screen: 0  Suicidal ideation/homicidal ideation: no/no  Therapy/counseling: in past, not currently  Medications: citalopram 20 mg daily (not taking)  Improving/worsening: getting worse, triggered by pain

## 2020-10-23 NOTE — PROGRESS NOTES
Subjective:     CC: carpal tunnel    HPI:   Nav presents today with     Bilateral carpal tunnel syndrome  This is a chronic condition. She has a h/o carpal tunnel release bilaterally. She was told she had moderate-severe nerve damage going into the left elbow. In the last week she has been having pain in her left inside elbow. She is getting pain & tingling in both hands and notices it when lifting for a long period for time. Now still feels the tingling without lifting. She hasn't been using wrist braces.    Major depressive disorder, recurrent episode, in full remission (HCC)  This is a chronic condition.  Onset: 2015  PHQ screen: 0  Suicidal ideation/homicidal ideation: no/no  Therapy/counseling: in past, not currently  Medications: citalopram 20 mg daily (not taking)  Improving/worsening: getting worse, triggered by pain      Moderate persistent asthma  This is a chronic condition. Her asthma is unchanged since our last appointment. Being around the dust causes an asthma exacerbation. She is around dust more often than not while at work. She is on advair twice daily with montelukast. She only requires albuterol maybe once/week with this regimen.      No past medical history on file.    Social History     Tobacco Use   • Smoking status: Never Smoker   • Smokeless tobacco: Never Used   Substance Use Topics   • Alcohol use: No   • Drug use: No       Current Outpatient Medications Ordered in Epic   Medication Sig Dispense Refill   • citalopram (CELEXA) 20 MG Tab Take 1 Tab by mouth every day. 90 Tab 3   • fluticasone-salmeterol (ADVAIR) 250-50 MCG/DOSE AEROSOL POWDER, BREATH ACTIVATED Inhale 1 Puff by mouth every 12 hours. 60 Each 3   • montelukast (SINGULAIR) 10 MG Tab TAKE 1 TABLET BY MOUTH EVERY DAY 30 Tab 0   • albuterol (PROVENTIL) 2.5mg/3ml Nebu Soln solution for nebulization 3 mL by Nebulization route every four hours as needed for Shortness of Breath. 30 Bullet 2   • albuterol 108 (90 Base) MCG/ACT Aero  "Soln inhalation aerosol Inhale 2 Puffs by mouth every four hours as needed for Shortness of Breath. 2 Inhaler 3     No current Epic-ordered facility-administered medications on file.        Allergies:  Patient has no known allergies.    Health Maintenance: Completed    ROS:  Gen: no fevers/chills  Pulm: no sob    Objective:     Exam:  /78 (BP Location: Right arm, Patient Position: Sitting, BP Cuff Size: Adult)   Pulse 68   Temp 36.7 °C (98.1 °F) (Temporal)   Resp 15   Ht 1.499 m (4' 11\")   Wt 75.8 kg (167 lb)   SpO2 98%   BMI 33.73 kg/m²  Body mass index is 33.73 kg/m².    Gen: Alert and oriented, No apparent distress.  Neck: Neck is supple without lymphadenopathy.  Lungs: Normal effort, CTA bilaterally, no wheezes, rhonchi, or rales  CV: Regular rate and rhythm. No murmurs, rubs, or gallops.  Ext: No clubbing, cyanosis, edema.    Assessment & Plan:     40 y.o. female with the following -     1. Bilateral carpal tunnel syndrome  This is a chronic condition, worsening.  She reports a history of bilateral carpal tunnel and was found to have moderate to severe nerve damage in the left arm moving up into the elbow.  She underwent carpal tunnel release in both wrists just a year ago with [facility].  She states in the last week she is beginning recurrence of symptoms where she is getting pain and tingling in both wrists.  Initially only occurred when she was lifting heavy weights but now seems to occur without lifting heavy weights.  She is also getting pain on the inside of her left elbow.  She not using any wrist braces right now.  Since she is already had a carpal tunnel release we will place an urgent referral to a hand orthopedic surgeon to discuss other treatment options to treat her carpal tunnel.  In the meantime, I have encouraged her to use wrist braces almost continuously to see if this will help manage symptoms.  I also did provide a work note to limit the weight she lifts at work.  - REFERRAL TO " ORTHOPEDICS    2. Major depressive disorder, recurrent episode, in full remission (HCC)  This is a chronic condition, uncontrolled.  She was on citalopram but stopped.  However, she notes her depression has been getting worse and she thinks is partially due to the pain.  She would like to restart the citalopram and I sent in a refill for her.  She denies any suicidal or homicidal ideation today.  -Restart citalopram 20 mg daily    3. Moderate persistent asthma without complication  This is a chronic condition, controlled.  She reports with the twice daily Advair and daily montelukast she is controlling her asthma symptoms much better.  She now only requires albuterol maybe once per week.  -Continue Advair twice daily  -Continue montelukast daily  -Continue albuterol as needed      Return in about 6 months (around 4/23/2021) for Annual/wellness visit.    Please note that this dictation was created using voice recognition software. I have made every reasonable attempt to correct obvious errors, but I expect that there are errors of grammar and possibly content that I did not discover before finalizing the note.

## 2020-10-23 NOTE — ASSESSMENT & PLAN NOTE
This is a chronic condition. Her asthma is unchanged since our last appointment. Being around the dust causes an asthma exacerbation. She is around dust more often than not while at work. She is on advair twice daily with montelukast. She only requires albuterol maybe once/week with this regimen.

## 2021-02-05 ENCOUNTER — TELEPHONE (OUTPATIENT)
Dept: MEDICAL GROUP | Facility: PHYSICIAN GROUP | Age: 41
End: 2021-02-05

## 2021-02-05 DIAGNOSIS — Z01.818 PREOPERATIVE EXAMINATION: ICD-10-CM

## 2021-02-05 DIAGNOSIS — E66.9 OBESITY (BMI 30-39.9): ICD-10-CM

## 2021-02-05 DIAGNOSIS — Z12.31 ENCOUNTER FOR SCREENING MAMMOGRAM FOR MALIGNANT NEOPLASM OF BREAST: ICD-10-CM

## 2021-02-05 NOTE — TELEPHONE ENCOUNTER
Phone Number Called: [phone] (home)       Call outcome: Spoke to patient regarding message below.    Message: Pt notified mammogram has been ordered. Pt also requests US of the abdomen to r/o hernias.

## 2021-02-05 NOTE — TELEPHONE ENCOUNTER
Could someone call and confirm this is required for her surgery? If not, and she has a concern for hernia, she needs an appointment. Thanks.

## 2021-02-05 NOTE — TELEPHONE ENCOUNTER
Phone Number Called: [phone] (home)     Call outcome: Spoke to patient regarding message below.     Message: I confirmed with patient that this is for her pending surgery to r/o hernias.

## 2021-02-05 NOTE — TELEPHONE ENCOUNTER
1. Caller Name: Nav Coates  Call Back Number: [phone] (home)     Patient called stating on 7/5/21 she will flying to [place], [place] for breast augmentation and tummy tuck procedure on 7/7/21.  She needs clearance prior to getting procedure done.  Appt scheduled for 6/10/21 to complete clearance form, EKG and place lab orders (she will bring form to appt).    She does need a mammogram completed prior to leaving [facility].  Could we order now? ...  I dont think she will be able to complete after clearance appt due to limited time before leaving.

## 2021-04-29 ENCOUNTER — HOSPITAL ENCOUNTER (OUTPATIENT)
Facility: MEDICAL CENTER | Age: 41
End: 2021-04-29
Attending: FAMILY MEDICINE
Payer: COMMERCIAL

## 2021-04-29 ENCOUNTER — OFFICE VISIT (OUTPATIENT)
Dept: MEDICAL GROUP | Facility: PHYSICIAN GROUP | Age: 41
End: 2021-04-29
Payer: COMMERCIAL

## 2021-04-29 VITALS
HEART RATE: 74 BPM | SYSTOLIC BLOOD PRESSURE: 100 MMHG | WEIGHT: 169 LBS | HEIGHT: 59 IN | OXYGEN SATURATION: 98 % | RESPIRATION RATE: 16 BRPM | BODY MASS INDEX: 34.07 KG/M2 | TEMPERATURE: 97.2 F | DIASTOLIC BLOOD PRESSURE: 78 MMHG

## 2021-04-29 DIAGNOSIS — Z11.51 SCREENING FOR HPV (HUMAN PAPILLOMAVIRUS): ICD-10-CM

## 2021-04-29 DIAGNOSIS — Z12.4 SCREENING FOR CERVICAL CANCER: ICD-10-CM

## 2021-04-29 DIAGNOSIS — N89.8 VAGINAL DISCHARGE: ICD-10-CM

## 2021-04-29 DIAGNOSIS — Z01.419 WELL WOMAN EXAM: Primary | ICD-10-CM

## 2021-04-29 DIAGNOSIS — Z11.3 SCREENING EXAMINATION FOR SEXUALLY TRANSMITTED DISEASE: ICD-10-CM

## 2021-04-29 DIAGNOSIS — E66.9 OBESITY (BMI 30-39.9): ICD-10-CM

## 2021-04-29 PROCEDURE — 88175 CYTOPATH C/V AUTO FLUID REDO: CPT

## 2021-04-29 PROCEDURE — 87510 GARDNER VAG DNA DIR PROBE: CPT

## 2021-04-29 PROCEDURE — 87491 CHLMYD TRACH DNA AMP PROBE: CPT

## 2021-04-29 PROCEDURE — 87624 HPV HI-RISK TYP POOLED RSLT: CPT

## 2021-04-29 PROCEDURE — 87591 N.GONORRHOEAE DNA AMP PROB: CPT

## 2021-04-29 PROCEDURE — 87480 CANDIDA DNA DIR PROBE: CPT

## 2021-04-29 PROCEDURE — 87660 TRICHOMONAS VAGIN DIR PROBE: CPT

## 2021-04-29 PROCEDURE — 99396 PREV VISIT EST AGE 40-64: CPT | Performed by: FAMILY MEDICINE

## 2021-04-29 NOTE — PROGRESS NOTES
"Subjective:     CC:   Chief Complaint   Patient presents with   • Annual Exam     pap, STD testing       HPI:   Nav Coates is a 41 y.o. female who presents for annual exam. She is feeling well and denies any complaints.    Ob-Gyn/ History:    Patient has GYN provider: no  /Para:    Last Pap Smear:  . No history of abnormal pap smears.  Gyn Surgery:  none.  Current Contraceptive Method:  None - PID. Yes currently sexually active.  Last menstrual period:  2021.  Periods regular. light bleeding. Cramping is mild.   She does take OTC analgesics for cramps.  + significant bloating/fluid retention  No pelvic pain or dyspareunia. No vaginal discharge  Post-menopausal bleeding: n/a  Urinary incontinence: n/a  Folate intake: yes     Health Maintenance  Advanced directive: n/a   Osteoporosis Screen/ DEXA: n/a   PT/vit D for falls prevention: n/a   Cholesterol Screening: ordered   Diabetes Screening: ordered   Aspirin Use: n/a    Diet: healthy \"as much as I can\"   Exercise: at least 2 days/week   Substance Abuse: None   Safe in relationship.   Seat belts, bike helmet, gun safety discussed.  Sun protection used.    Cancer screening  Colorectal Cancer Screening: n/a    Lung Cancer Screening: n/a - never smoker    Cervical Cancer Screening: today   Breast Cancer Screening: scheduled     Infectious disease screening/Immunizations  --STI Screening: ordered   --Practices safe sex.  --HIV Screening: ordered   --Hepatitis C Screening: n/a   --Immunizations:    Influenza: completed    HPV:  n/a    Tetanus: due     Shingles: n/a    Pneumococcal : n/a     Other immunizations: COVID-19 #1 2 days ago    She  has no past medical history on file.  She  has a past surgical history that includes carpal tunnel release (Bilateral, 2019).    Family History   Problem Relation Age of Onset   • Lupus Mother    • Heart Disease Father    • No Known Problems Sister    • Cancer Maternal Aunt         breast   • Cancer Maternal " Grandmother         breast   • Stroke Maternal Grandmother    • No Known Problems Sister    • No Known Problems Sister    • No Known Problems Sister    • Cancer Maternal Aunt         brain   • Diabetes Neg Hx    • Alcohol abuse Neg Hx    • Drug abuse Neg Hx        Social History     Socioeconomic History   • Marital status: Legally      Spouse name: Not on file   • Number of children: Not on file   • Years of education: Not on file   • Highest education level: Not on file   Occupational History   • Not on file   Tobacco Use   • Smoking status: Never Smoker   • Smokeless tobacco: Never Used   Substance and Sexual Activity   • Alcohol use: No   • Drug use: No   • Sexual activity: Yes     Partners: Male     Birth control/protection: None     Comment:     Other Topics Concern   • Not on file   Social History Narrative   • Not on file     Social Determinants of Health     Financial Resource Strain:    • Difficulty of Paying Living Expenses:    Food Insecurity:    • Worried About Running Out of Food in the Last Year:    • Ran Out of Food in the Last Year:    Transportation Needs:    • Lack of Transportation (Medical):    • Lack of Transportation (Non-Medical):    Physical Activity:    • Days of Exercise per Week:    • Minutes of Exercise per Session:    Stress:    • Feeling of Stress :    Social Connections:    • Frequency of Communication with Friends and Family:    • Frequency of Social Gatherings with Friends and Family:    • Attends [employer] Services:    • Active Member of Clubs or Organizations:    • Attends Club or Organization Meetings:    • Marital Status:    Intimate Partner Violence:    • Fear of Current or Ex-Partner:    • Emotionally Abused:    • Physically Abused:    • Sexually Abused:        Patient Active Problem List    Diagnosis Date Noted   • Bilateral carpal tunnel syndrome 04/10/2019   • Major depressive disorder, recurrent episode, in full remission (HCC) 02/03/2016   • Obesity (BMI  "30-39.9) 02/03/2016   • Chronic low back pain 12/15/2015   • Anxiety disorder 10/15/2015   • Attention deficit hyperactivity disorder (ADHD), predominantly inattentive type 10/26/2005   • Allergic rhinitis 11/03/2004   • Moderate persistent asthma 11/03/2004         Current Outpatient Medications   Medication Sig Dispense Refill   • montelukast (SINGULAIR) 10 MG Tab TAKE 1 TABLET BY MOUTH EVERY DAY 90 tablet 1   • citalopram (CELEXA) 20 MG Tab Take 1 Tab by mouth every day. 90 Tab 3   • fluticasone-salmeterol (ADVAIR) 250-50 MCG/DOSE AEROSOL POWDER, BREATH ACTIVATED Inhale 1 Puff by mouth every 12 hours. 60 Each 3   • albuterol (PROVENTIL) 2.5mg/3ml Nebu Soln solution for nebulization 3 mL by Nebulization route every four hours as needed for Shortness of Breath. 30 Bullet 2   • albuterol 108 (90 Base) MCG/ACT Aero Soln inhalation aerosol Inhale 2 Puffs by mouth every four hours as needed for Shortness of Breath. 2 Inhaler 3     No current facility-administered medications for this visit.     No Known Allergies    Review of Systems   Constitutional: Negative for fever, chills.   HENT: Positive for congestion - allergies  Eyes: Negative for pain.    Respiratory: Negative for cough.  Cardiovascular: Negative for leg swelling.   Gastrointestinal: Negative for nausea, vomiting.   Genitourinary: Negative for dysuria  Skin: Negative for rash.   Neurological: Negative for dizziness.   Endo/Heme/Allergies: Does not bleed easily.   Psychiatric/Behavioral: Negative for depression.  The patient is not nervous/anxious.      Objective:     /78 (BP Location: Left arm, Patient Position: Sitting, BP Cuff Size: Adult)   Pulse 74   Temp 36.2 °C (97.2 °F) (Temporal)   Resp 16   Ht 1.499 m (4' 11\")   Wt 76.7 kg (169 lb)   SpO2 98%   BMI 34.13 kg/m²   Body mass index is 34.13 kg/m².  Wt Readings from Last 4 Encounters:   04/29/21 76.7 kg (169 lb)   10/23/20 75.8 kg (167 lb)   08/12/20 75.2 kg (165 lb 12.8 oz)   03/13/20 77.3 " kg (170 lb 6.4 oz)       Physical Exam:  Constitutional: Well-developed and well-nourished. Not diaphoretic. No distress.   Skin: Skin is warm and dry. No rash noted.  Head: Atraumatic without lesions.  Eyes: Conjunctivae and extraocular motions are normal. Pupils are equal, round, and reactive to light. No scleral icterus.   Ears:  External ears unremarkable. Tympanic membranes clear and intact.  Mouth/Throat: Dentition is good. Tongue normal. Oropharynx is clear and moist. Posterior pharynx without erythema or exudates.  Neck: Supple, trachea midline. Normal range of motion. No thyromegaly present. No lymphadenopathy--cervical or supraclavicular.  Cardiovascular: Regular rate and rhythm, S1 and S2 without murmur, rubs, or gallops.  Lungs: Normal inspiratory effort, CTA bilaterally, no wheezes/rhonchi/rales  Abdomen: Soft, non tender, and without distention. Active bowel sounds in all four quadrants. No rebound, guarding, masses or HSM.  :Perineum and external genitalia normal without rash. Vagina with white discharge. Cervix without visible lesions or discharge.  Extremities: No cyanosis, clubbing, erythema, nor edema. Distal pulses intact and symmetric.   Musculoskeletal: All major joints AROM full in all directions without pain.  Neurological: Alert and oriented x 3. DTRs 2+/3 and symmetric. No cranial nerve deficit. 5/5 myotomes. Sensation intact.   Psychiatric:  Behavior, mood, and affect are appropriate.    A chaperone was offered to the patient during today's exam. Chaperone name: Se Patel was present.    Assessment and Plan:     1. Well woman exam  Comp Metabolic Panel    Lipid Profile   2. Obesity (BMI 30-39.9)  Comp Metabolic Panel    Lipid Profile    Patient identified as having weight management issue.  Appropriate orders and counseling given.   3. Screening for cervical cancer  THINPREP PAP WITH HPV   4. Screening for HPV (human papillomavirus)  THINPREP PAP WITH HPV   5. Screening examination for  sexually transmitted disease  T.PALLIDUM AB EIA    HIV AG/AB COMBO ASSAY SCREENING    Chlamydia/GC PCR Urine Or Swab    CANCELED: Chlamydia/GC PCR Urine Or Swab   6. Vaginal discharge  VAGINAL PATHOGENS DNA PANEL       HCM:  Up to date   Labs per orders  Immunizations per orders  Patient counseled about skin care, diet, supplements, prenatal vitamins, safe sex and exercise.      Follow-up: Return in 1 year (on 4/29/2022) for Annual/wellness visit.

## 2021-04-29 NOTE — LETTER
April 29, 2021        To Whomever It May Concern RE:  Ledra Coates     Due to worsening carpal tunnel symptoms and elbow pain, please limit weight lifting to 10 lbs or less.     Thank you,  Sarah Ramírez M.D.

## 2021-04-30 DIAGNOSIS — Z12.4 SCREENING FOR CERVICAL CANCER: ICD-10-CM

## 2021-04-30 DIAGNOSIS — Z11.51 SCREENING FOR HPV (HUMAN PAPILLOMAVIRUS): ICD-10-CM

## 2021-04-30 LAB
C TRACH DNA SPEC QL NAA+PROBE: NEGATIVE
CANDIDA DNA VAG QL PROBE+SIG AMP: NEGATIVE
G VAGINALIS DNA VAG QL PROBE+SIG AMP: POSITIVE
N GONORRHOEA DNA SPEC QL NAA+PROBE: NEGATIVE
SPECIMEN SOURCE: NORMAL
T VAGINALIS DNA VAG QL PROBE+SIG AMP: NEGATIVE

## 2021-04-30 RX ORDER — METRONIDAZOLE 500 MG/1
500 TABLET ORAL 2 TIMES DAILY
Qty: 14 TABLET | Refills: 0 | Status: SHIPPED | OUTPATIENT
Start: 2021-04-30 | End: 2021-05-07

## 2021-05-01 LAB
CYTOLOGY REG CYTOL: NORMAL
HPV HR 12 DNA CVX QL NAA+PROBE: NEGATIVE
HPV16 DNA SPEC QL NAA+PROBE: NEGATIVE
HPV18 DNA SPEC QL NAA+PROBE: NEGATIVE
SPECIMEN SOURCE: NORMAL

## 2021-05-13 ENCOUNTER — HOSPITAL ENCOUNTER (OUTPATIENT)
Dept: LAB | Facility: MEDICAL CENTER | Age: 41
End: 2021-05-13
Attending: FAMILY MEDICINE
Payer: COMMERCIAL

## 2021-05-13 ENCOUNTER — HOSPITAL ENCOUNTER (OUTPATIENT)
Dept: LAB | Facility: MEDICAL CENTER | Age: 41
End: 2021-05-13
Attending: PLASTIC SURGERY
Payer: COMMERCIAL

## 2021-05-13 DIAGNOSIS — Z01.419 WELL WOMAN EXAM: ICD-10-CM

## 2021-05-13 DIAGNOSIS — Z11.3 SCREENING EXAMINATION FOR SEXUALLY TRANSMITTED DISEASE: ICD-10-CM

## 2021-05-13 DIAGNOSIS — E66.9 OBESITY (BMI 30-39.9): ICD-10-CM

## 2021-05-13 LAB
ALBUMIN SERPL BCP-MCNC: 3.9 G/DL (ref 3.2–4.9)
ALBUMIN SERPL BCP-MCNC: 3.9 G/DL (ref 3.2–4.9)
ALBUMIN/GLOB SERPL: 1.2 G/DL
ALBUMIN/GLOB SERPL: 1.2 G/DL
ALP SERPL-CCNC: 55 U/L (ref 30–99)
ALP SERPL-CCNC: 55 U/L (ref 30–99)
ALT SERPL-CCNC: 15 U/L (ref 2–50)
ALT SERPL-CCNC: 16 U/L (ref 2–50)
ANION GAP SERPL CALC-SCNC: 7 MMOL/L (ref 7–16)
ANION GAP SERPL CALC-SCNC: 7 MMOL/L (ref 7–16)
APPEARANCE UR: CLEAR
APTT PPP: 30.5 SEC (ref 24.7–36)
AST SERPL-CCNC: 15 U/L (ref 12–45)
AST SERPL-CCNC: 17 U/L (ref 12–45)
BACTERIA #/AREA URNS HPF: NEGATIVE /HPF
BASOPHILS # BLD AUTO: 0.7 % (ref 0–1.8)
BASOPHILS # BLD: 0.03 K/UL (ref 0–0.12)
BILIRUB SERPL-MCNC: 0.3 MG/DL (ref 0.1–1.5)
BILIRUB SERPL-MCNC: 0.3 MG/DL (ref 0.1–1.5)
BILIRUB UR QL STRIP.AUTO: NEGATIVE
BUN SERPL-MCNC: 10 MG/DL (ref 8–22)
BUN SERPL-MCNC: 10 MG/DL (ref 8–22)
CALCIUM SERPL-MCNC: 9.4 MG/DL (ref 8.5–10.5)
CALCIUM SERPL-MCNC: 9.4 MG/DL (ref 8.5–10.5)
CHLORIDE SERPL-SCNC: 104 MMOL/L (ref 96–112)
CHLORIDE SERPL-SCNC: 105 MMOL/L (ref 96–112)
CHOLEST SERPL-MCNC: 239 MG/DL (ref 100–199)
CO2 SERPL-SCNC: 26 MMOL/L (ref 20–33)
CO2 SERPL-SCNC: 27 MMOL/L (ref 20–33)
COLOR UR: YELLOW
CREAT SERPL-MCNC: 0.74 MG/DL (ref 0.5–1.4)
CREAT SERPL-MCNC: 0.75 MG/DL (ref 0.5–1.4)
EOSINOPHIL # BLD AUTO: 0.27 K/UL (ref 0–0.51)
EOSINOPHIL NFR BLD: 6.3 % (ref 0–6.9)
EPI CELLS #/AREA URNS HPF: NEGATIVE /HPF
ERYTHROCYTE [DISTWIDTH] IN BLOOD BY AUTOMATED COUNT: 41.6 FL (ref 35.9–50)
EST. AVERAGE GLUCOSE BLD GHB EST-MCNC: 128 MG/DL
GLOBULIN SER CALC-MCNC: 3.2 G/DL (ref 1.9–3.5)
GLOBULIN SER CALC-MCNC: 3.2 G/DL (ref 1.9–3.5)
GLUCOSE SERPL-MCNC: 98 MG/DL (ref 65–99)
GLUCOSE SERPL-MCNC: 98 MG/DL (ref 65–99)
GLUCOSE UR STRIP.AUTO-MCNC: NEGATIVE MG/DL
HBA1C MFR BLD: 6.1 % (ref 4–5.6)
HCG SERPL QL: NEGATIVE
HCT VFR BLD AUTO: 45.3 % (ref 37–47)
HCV AB SER QL: NORMAL
HDLC SERPL-MCNC: 51 MG/DL
HGB BLD-MCNC: 14.9 G/DL (ref 12–16)
HIV 1+2 AB+HIV1 P24 AG SERPL QL IA: NORMAL
HIV 1+2 AB+HIV1 P24 AG SERPL QL IA: NORMAL
HYALINE CASTS #/AREA URNS LPF: ABNORMAL /LPF
IMM GRANULOCYTES # BLD AUTO: 0.01 K/UL (ref 0–0.11)
IMM GRANULOCYTES NFR BLD AUTO: 0.2 % (ref 0–0.9)
INR PPP: 0.96 (ref 0.87–1.13)
KETONES UR STRIP.AUTO-MCNC: NEGATIVE MG/DL
LDLC SERPL CALC-MCNC: 162 MG/DL
LEUKOCYTE ESTERASE UR QL STRIP.AUTO: NEGATIVE
LYMPHOCYTES # BLD AUTO: 2.02 K/UL (ref 1–4.8)
LYMPHOCYTES NFR BLD: 47.3 % (ref 22–41)
MCH RBC QN AUTO: 29.5 PG (ref 27–33)
MCHC RBC AUTO-ENTMCNC: 32.9 G/DL (ref 33.6–35)
MCV RBC AUTO: 89.7 FL (ref 81.4–97.8)
MICRO URNS: ABNORMAL
MONOCYTES # BLD AUTO: 0.31 K/UL (ref 0–0.85)
MONOCYTES NFR BLD AUTO: 7.3 % (ref 0–13.4)
NEUTROPHILS # BLD AUTO: 1.63 K/UL (ref 2–7.15)
NEUTROPHILS NFR BLD: 38.2 % (ref 44–72)
NITRITE UR QL STRIP.AUTO: NEGATIVE
NRBC # BLD AUTO: 0 K/UL
NRBC BLD-RTO: 0 /100 WBC
PH UR STRIP.AUTO: 6 [PH] (ref 5–8)
PLATELET # BLD AUTO: 298 K/UL (ref 164–446)
PMV BLD AUTO: 10.8 FL (ref 9–12.9)
POTASSIUM SERPL-SCNC: 3.9 MMOL/L (ref 3.6–5.5)
POTASSIUM SERPL-SCNC: 3.9 MMOL/L (ref 3.6–5.5)
PROT SERPL-MCNC: 7.1 G/DL (ref 6–8.2)
PROT SERPL-MCNC: 7.1 G/DL (ref 6–8.2)
PROT UR QL STRIP: NEGATIVE MG/DL
PROTHROMBIN TIME: 13.1 SEC (ref 12–14.6)
RBC # BLD AUTO: 5.05 M/UL (ref 4.2–5.4)
RBC # URNS HPF: ABNORMAL /HPF
RBC UR QL AUTO: ABNORMAL
SODIUM SERPL-SCNC: 138 MMOL/L (ref 135–145)
SODIUM SERPL-SCNC: 138 MMOL/L (ref 135–145)
SP GR UR STRIP.AUTO: 1.02
TREPONEMA PALLIDUM IGG+IGM AB [PRESENCE] IN SERUM OR PLASMA BY IMMUNOASSAY: NORMAL
TRIGL SERPL-MCNC: 131 MG/DL (ref 0–149)
TSH SERPL DL<=0.005 MIU/L-ACNC: 0.69 UIU/ML (ref 0.38–5.33)
UROBILINOGEN UR STRIP.AUTO-MCNC: 0.2 MG/DL
WBC # BLD AUTO: 4.3 K/UL (ref 4.8–10.8)
WBC #/AREA URNS HPF: ABNORMAL /HPF

## 2021-05-13 PROCEDURE — 36415 COLL VENOUS BLD VENIPUNCTURE: CPT

## 2021-05-13 PROCEDURE — 84443 ASSAY THYROID STIM HORMONE: CPT

## 2021-05-13 PROCEDURE — 87389 HIV-1 AG W/HIV-1&-2 AB AG IA: CPT

## 2021-05-13 PROCEDURE — 83036 HEMOGLOBIN GLYCOSYLATED A1C: CPT

## 2021-05-13 PROCEDURE — 85025 COMPLETE CBC W/AUTO DIFF WBC: CPT

## 2021-05-13 PROCEDURE — 80053 COMPREHEN METABOLIC PANEL: CPT | Mod: 91

## 2021-05-13 PROCEDURE — 85610 PROTHROMBIN TIME: CPT

## 2021-05-13 PROCEDURE — 81001 URINALYSIS AUTO W/SCOPE: CPT

## 2021-05-13 PROCEDURE — 86780 TREPONEMA PALLIDUM: CPT

## 2021-05-13 PROCEDURE — 80061 LIPID PANEL: CPT

## 2021-05-13 PROCEDURE — 85730 THROMBOPLASTIN TIME PARTIAL: CPT

## 2021-05-13 PROCEDURE — 86803 HEPATITIS C AB TEST: CPT

## 2021-05-13 PROCEDURE — 84703 CHORIONIC GONADOTROPIN ASSAY: CPT

## 2021-05-13 PROCEDURE — 80053 COMPREHEN METABOLIC PANEL: CPT

## 2021-05-13 PROCEDURE — 87389 HIV-1 AG W/HIV-1&-2 AB AG IA: CPT | Mod: 91

## 2021-05-19 ENCOUNTER — HOSPITAL ENCOUNTER (OUTPATIENT)
Dept: LAB | Facility: MEDICAL CENTER | Age: 41
End: 2021-05-19
Attending: FAMILY MEDICINE
Payer: COMMERCIAL

## 2021-05-19 DIAGNOSIS — R31.21 ASYMPTOMATIC MICROSCOPIC HEMATURIA: ICD-10-CM

## 2021-05-19 LAB — HCG UR QL: NEGATIVE

## 2021-05-19 PROCEDURE — 81025 URINE PREGNANCY TEST: CPT

## 2021-05-20 DIAGNOSIS — R31.21 ASYMPTOMATIC MICROSCOPIC HEMATURIA: ICD-10-CM

## 2021-05-20 NOTE — PROGRESS NOTES
Asymptomatic microscopic hematuria noted. She is over age 33 and negative for pregnancy. Therefore, next step is a CT urogram with urology referral for possible cystoscopy.

## 2021-06-03 ENCOUNTER — HOSPITAL ENCOUNTER (OUTPATIENT)
Dept: RADIOLOGY | Facility: MEDICAL CENTER | Age: 41
End: 2021-06-03
Attending: FAMILY MEDICINE
Payer: COMMERCIAL

## 2021-06-03 ENCOUNTER — APPOINTMENT (OUTPATIENT)
Dept: LAB | Facility: MEDICAL CENTER | Age: 41
End: 2021-06-03
Attending: FAMILY MEDICINE
Payer: COMMERCIAL

## 2021-06-03 DIAGNOSIS — Z01.818 PREOPERATIVE EXAMINATION: ICD-10-CM

## 2021-06-03 DIAGNOSIS — E66.9 OBESITY (BMI 30-39.9): ICD-10-CM

## 2021-06-03 PROCEDURE — 76705 ECHO EXAM OF ABDOMEN: CPT

## 2021-06-10 ENCOUNTER — HOSPITAL ENCOUNTER (OUTPATIENT)
Dept: LAB | Facility: MEDICAL CENTER | Age: 41
End: 2021-06-10
Attending: FAMILY MEDICINE
Payer: COMMERCIAL

## 2021-06-10 ENCOUNTER — HOSPITAL ENCOUNTER (OUTPATIENT)
Dept: LAB | Facility: MEDICAL CENTER | Age: 41
End: 2021-06-10
Attending: PLASTIC SURGERY
Payer: COMMERCIAL

## 2021-06-10 ENCOUNTER — OFFICE VISIT (OUTPATIENT)
Dept: MEDICAL GROUP | Facility: PHYSICIAN GROUP | Age: 41
End: 2021-06-10

## 2021-06-10 VITALS
HEIGHT: 59 IN | TEMPERATURE: 98.2 F | OXYGEN SATURATION: 99 % | DIASTOLIC BLOOD PRESSURE: 64 MMHG | WEIGHT: 161.6 LBS | RESPIRATION RATE: 18 BRPM | SYSTOLIC BLOOD PRESSURE: 110 MMHG | HEART RATE: 64 BPM | BODY MASS INDEX: 32.58 KG/M2

## 2021-06-10 DIAGNOSIS — R30.0 DYSURIA: ICD-10-CM

## 2021-06-10 DIAGNOSIS — Z01.818 PREOPERATIVE EXAMINATION: ICD-10-CM

## 2021-06-10 LAB
ALBUMIN SERPL BCP-MCNC: 4 G/DL (ref 3.2–4.9)
ALBUMIN/GLOB SERPL: 1.3 G/DL
ALP SERPL-CCNC: 57 U/L (ref 30–99)
ALT SERPL-CCNC: 20 U/L (ref 2–50)
ANION GAP SERPL CALC-SCNC: 14 MMOL/L (ref 7–16)
APPEARANCE UR: CLEAR
APPEARANCE UR: CLEAR
APTT PPP: 29.7 SEC (ref 24.7–36)
AST SERPL-CCNC: 24 U/L (ref 12–45)
BACTERIA #/AREA URNS HPF: NEGATIVE /HPF
BACTERIA #/AREA URNS HPF: NEGATIVE /HPF
BASOPHILS # BLD AUTO: 0.8 % (ref 0–1.8)
BASOPHILS # BLD: 0.03 K/UL (ref 0–0.12)
BILIRUB SERPL-MCNC: 0.3 MG/DL (ref 0.1–1.5)
BILIRUB UR QL STRIP.AUTO: NEGATIVE
BILIRUB UR QL STRIP.AUTO: NEGATIVE
BUN SERPL-MCNC: 6 MG/DL (ref 8–22)
CALCIUM SERPL-MCNC: 9.6 MG/DL (ref 8.5–10.5)
CHLORIDE SERPL-SCNC: 104 MMOL/L (ref 96–112)
CO2 SERPL-SCNC: 21 MMOL/L (ref 20–33)
COLOR UR: YELLOW
COLOR UR: YELLOW
CREAT SERPL-MCNC: 0.89 MG/DL (ref 0.5–1.4)
EOSINOPHIL # BLD AUTO: 0.26 K/UL (ref 0–0.51)
EOSINOPHIL NFR BLD: 7.2 % (ref 0–6.9)
EPI CELLS #/AREA URNS HPF: NEGATIVE /HPF
EPI CELLS #/AREA URNS HPF: NEGATIVE /HPF
ERYTHROCYTE [DISTWIDTH] IN BLOOD BY AUTOMATED COUNT: 43.8 FL (ref 35.9–50)
EST. AVERAGE GLUCOSE BLD GHB EST-MCNC: 114 MG/DL
GLOBULIN SER CALC-MCNC: 3.2 G/DL (ref 1.9–3.5)
GLUCOSE SERPL-MCNC: 96 MG/DL (ref 65–99)
GLUCOSE UR STRIP.AUTO-MCNC: NEGATIVE MG/DL
GLUCOSE UR STRIP.AUTO-MCNC: NEGATIVE MG/DL
HBA1C MFR BLD: 5.6 % (ref 4–5.6)
HCG SERPL QL: NEGATIVE
HCT VFR BLD AUTO: 44.9 % (ref 37–47)
HCV AB SER QL: NORMAL
HGB BLD-MCNC: 14.1 G/DL (ref 12–16)
HIV 1+2 AB+HIV1 P24 AG SERPL QL IA: NORMAL
HYALINE CASTS #/AREA URNS LPF: NORMAL /LPF
HYALINE CASTS #/AREA URNS LPF: NORMAL /LPF
IMM GRANULOCYTES # BLD AUTO: 0.01 K/UL (ref 0–0.11)
IMM GRANULOCYTES NFR BLD AUTO: 0.3 % (ref 0–0.9)
INR PPP: 0.95 (ref 0.87–1.13)
KETONES UR STRIP.AUTO-MCNC: NEGATIVE MG/DL
KETONES UR STRIP.AUTO-MCNC: NEGATIVE MG/DL
LEUKOCYTE ESTERASE UR QL STRIP.AUTO: ABNORMAL
LEUKOCYTE ESTERASE UR QL STRIP.AUTO: NEGATIVE
LYMPHOCYTES # BLD AUTO: 1.76 K/UL (ref 1–4.8)
LYMPHOCYTES NFR BLD: 49 % (ref 22–41)
MCH RBC QN AUTO: 28.9 PG (ref 27–33)
MCHC RBC AUTO-ENTMCNC: 31.4 G/DL (ref 33.6–35)
MCV RBC AUTO: 92 FL (ref 81.4–97.8)
MICRO URNS: ABNORMAL
MICRO URNS: ABNORMAL
MONOCYTES # BLD AUTO: 0.29 K/UL (ref 0–0.85)
MONOCYTES NFR BLD AUTO: 8.1 % (ref 0–13.4)
NEUTROPHILS # BLD AUTO: 1.24 K/UL (ref 2–7.15)
NEUTROPHILS NFR BLD: 34.6 % (ref 44–72)
NITRITE UR QL STRIP.AUTO: NEGATIVE
NITRITE UR QL STRIP.AUTO: NEGATIVE
NRBC # BLD AUTO: 0 K/UL
NRBC BLD-RTO: 0 /100 WBC
PH UR STRIP.AUTO: 7 [PH] (ref 5–8)
PH UR STRIP.AUTO: 7 [PH] (ref 5–8)
PLATELET # BLD AUTO: 258 K/UL (ref 164–446)
PMV BLD AUTO: 12.2 FL (ref 9–12.9)
POTASSIUM SERPL-SCNC: 4.6 MMOL/L (ref 3.6–5.5)
PROT SERPL-MCNC: 7.2 G/DL (ref 6–8.2)
PROT UR QL STRIP: NEGATIVE MG/DL
PROT UR QL STRIP: NEGATIVE MG/DL
PROTHROMBIN TIME: 12.4 SEC (ref 12–14.6)
RBC # BLD AUTO: 4.88 M/UL (ref 4.2–5.4)
RBC # URNS HPF: NORMAL /HPF
RBC # URNS HPF: NORMAL /HPF
RBC UR QL AUTO: ABNORMAL
RBC UR QL AUTO: ABNORMAL
SODIUM SERPL-SCNC: 139 MMOL/L (ref 135–145)
SP GR UR STRIP.AUTO: 1.01
SP GR UR STRIP.AUTO: 1.01
TSH SERPL DL<=0.005 MIU/L-ACNC: 1.02 UIU/ML (ref 0.38–5.33)
UROBILINOGEN UR STRIP.AUTO-MCNC: 0.2 MG/DL
UROBILINOGEN UR STRIP.AUTO-MCNC: 0.2 MG/DL
WBC # BLD AUTO: 3.6 K/UL (ref 4.8–10.8)
WBC #/AREA URNS HPF: NORMAL /HPF
WBC #/AREA URNS HPF: NORMAL /HPF

## 2021-06-10 PROCEDURE — 85610 PROTHROMBIN TIME: CPT

## 2021-06-10 PROCEDURE — 80053 COMPREHEN METABOLIC PANEL: CPT

## 2021-06-10 PROCEDURE — 85730 THROMBOPLASTIN TIME PARTIAL: CPT

## 2021-06-10 PROCEDURE — 99213 OFFICE O/P EST LOW 20 MIN: CPT | Performed by: FAMILY MEDICINE

## 2021-06-10 PROCEDURE — 84703 CHORIONIC GONADOTROPIN ASSAY: CPT

## 2021-06-10 PROCEDURE — 81001 URINALYSIS AUTO W/SCOPE: CPT

## 2021-06-10 PROCEDURE — 84443 ASSAY THYROID STIM HORMONE: CPT

## 2021-06-10 PROCEDURE — 36415 COLL VENOUS BLD VENIPUNCTURE: CPT

## 2021-06-10 PROCEDURE — 85025 COMPLETE CBC W/AUTO DIFF WBC: CPT

## 2021-06-10 PROCEDURE — 87389 HIV-1 AG W/HIV-1&-2 AB AG IA: CPT

## 2021-06-10 PROCEDURE — 86803 HEPATITIS C AB TEST: CPT

## 2021-06-10 PROCEDURE — 83036 HEMOGLOBIN GLYCOSYLATED A1C: CPT

## 2021-06-10 PROCEDURE — 81001 URINALYSIS AUTO W/SCOPE: CPT | Mod: 91

## 2021-06-10 ASSESSMENT — FIBROSIS 4 INDEX: FIB4 SCORE: 0.63

## 2021-06-10 NOTE — PROGRESS NOTES
REASON FOR VISIT: Pre-Op Consultation  Consultation Requested by: Dr. Dacia Dubois  Procedure date and type: breast lift and tummy tuck  Inherent cardiac risk of procedure: low    History of condition for which surgery is planned: Cosmetic procedure. No h/o symptoms    Current chronic conditions: does not have any pertinent problems on file.    Past medical history:  has no past medical history on file.. Negative for: CAD, SBE, CVA, TIA, DVT, PE, bleeding requiring transfusion, intubation.    Surgical and anesthetic history:  has a past surgical history that includes carpal tunnel release (Bilateral, 2019). Prior surgery without complication, bleeding, reaction to anesthetic, prolonged recovery    Habits:   Social History     Tobacco Use   • Smoking status: Never Smoker   • Smokeless tobacco: Never Used   Vaping Use   • Vaping Use: Never used   Substance Use Topics   • Alcohol use: No   • Drug use: No       Allergies: Patient has no known allergies. No known allergy to Anesthetic, or Latex.       Current medicines:   Current Outpatient Medications   Medication Sig Dispense Refill   • montelukast (SINGULAIR) 10 MG Tab TAKE 1 TABLET BY MOUTH EVERY DAY 90 tablet 1   • fluticasone-salmeterol (ADVAIR) 250-50 MCG/DOSE AEROSOL POWDER, BREATH ACTIVATED Inhale 1 Puff by mouth every 12 hours. 60 Each 3   • albuterol (PROVENTIL) 2.5mg/3ml Nebu Soln solution for nebulization 3 mL by Nebulization route every four hours as needed for Shortness of Breath. 30 Bullet 2   • albuterol 108 (90 Base) MCG/ACT Aero Soln inhalation aerosol Inhale 2 Puffs by mouth every four hours as needed for Shortness of Breath. 2 Inhaler 3   • citalopram (CELEXA) 20 MG Tab Take 1 Tab by mouth every day. (Patient not taking: Reported on 6/10/2021) 90 Tab 3     No current facility-administered medications for this visit.       Anticoagulant: ASA, NSAIDs    Herbals: Black Cohash, Echinacea, Garlic, Ginger, Ginkgo Biloba, Ginseng, Kava, Haydee’s Wort,  Saw  "Justin Andrew               [place] Activity Status Index: 58.2  METS: 9.89 - moderate functional capacity  ASA Class: 2    ROS: negative for: CP, SOB, SHAH, Orthopnea, wheezing, leg edema, polydipsia, polyuria, fevers, chills, sweats, cough, cold, congestion, abd pain, reflux, black or bloody stools, weight loss/gain.    PHYSICAL EXAMINATION:  VITAL SIGNS: /64 (BP Location: Left arm, Patient Position: Sitting, BP Cuff Size: Adult)   Pulse 64   Temp 36.8 °C (98.2 °F) (Temporal)   Resp 18   Ht 1.499 m (4' 11\")   Wt 73.3 kg (161 lb 9.6 oz)   SpO2 99%  Body mass index is 32.64 kg/m².  HEENT: EOMI, PERRL. Oropharynx pink, moist. Normal airway. Neck supple, no cervical lymphadenopathy.  LUNGS: CTAB good excursion.   HEART: RRR no murmur.  ABDOMEN: soft, nondistended, nontender normal BS. No HSM.  LOWER EXTREMITIES: warm and well perfused with no edema.    Labs/Imaging:   Component      Latest Ref Rng & Units 6/10/2021 6/10/2021 6/10/2021 6/10/2021           9:22 AM  9:22 AM  9:22 AM  9:22 AM   WBC      4.8 - 10.8 K/uL   3.6 (L)    RBC      4.20 - 5.40 M/uL   4.88    Hemoglobin      12.0 - 16.0 g/dL   14.1    Hematocrit      37.0 - 47.0 %   44.9    MCV      81.4 - 97.8 fL   92.0    MCH      27.0 - 33.0 pg   28.9    MCHC      33.6 - 35.0 g/dL   31.4 (L)    RDW      35.9 - 50.0 fL   43.8    Platelet Count      164 - 446 K/uL   258    MPV      9.0 - 12.9 fL   12.2    Neutrophils-Polys      44.00 - 72.00 %   34.60 (L)    Lymphocytes      22.00 - 41.00 %   49.00 (H)    Monocytes      0.00 - 13.40 %   8.10    Eosinophils      0.00 - 6.90 %   7.20 (H)    Basophils      0.00 - 1.80 %   0.80    Immature Granulocytes      0.00 - 0.90 %   0.30    Nucleated RBC      /100 WBC   0.00    Neutrophils (Absolute)      2.00 - 7.15 K/uL   1.24 (L)    Lymphs (Absolute)      1.00 - 4.80 K/uL   1.76    Monos (Absolute)      0.00 - 0.85 K/uL   0.29    Eos (Absolute)      0.00 - 0.51 K/uL   0.26    Baso (Absolute)      0.00 - 0.12 " K/uL   0.03    Immature Granulocytes (abs)      0.00 - 0.11 K/uL   0.01    NRBC (Absolute)      K/uL   0.00    Sodium      135 - 145 mmol/L    139   Potassium      3.6 - 5.5 mmol/L    4.6   Chloride      96 - 112 mmol/L    104   Co2      20 - 33 mmol/L    21   Anion Gap      7.0 - 16.0    14.0   Glucose      65 - 99 mg/dL    96   Bun      8 - 22 mg/dL    6 (L)   Creatinine      0.50 - 1.40 mg/dL    0.89   Calcium      8.5 - 10.5 mg/dL    9.6   AST(SGOT)      12 - 45 U/L    24   ALT(SGPT)      2 - 50 U/L    20   Alkaline Phosphatase      30 - 99 U/L    57   Total Bilirubin      0.1 - 1.5 mg/dL    0.3   Albumin      3.2 - 4.9 g/dL    4.0   Total Protein      6.0 - 8.2 g/dL    7.2   Globulin      1.9 - 3.5 g/dL    3.2   A-G Ratio      g/dL    1.3   Color             Character             Specific Gravity      <1.035       Ph      5.0 - 8.0       Glucose      Negative mg/dL       Ketones      Negative mg/dL       Protein      Negative mg/dL       Bilirubin      Negative       Urobilinogen, Urine      Negative       Nitrite      Negative       Leukocyte Esterase      Negative       Occult Blood      Negative       Micro Urine Req             WBC      /hpf       RBC      /hpf       Bacteria      None /hpf       Epithelial Cells      /hpf       Hyaline Cast      /lpf       Glycohemoglobin      4.0 - 5.6 %       Estim. Avg Glu      mg/dL       PT      12.0 - 14.6 sec 12.4      INR      0.87 - 1.13 0.95      GFR If African American      >60 mL/min/1.73 m 2       GFR If Non African American      >60 mL/min/1.73 m 2       TSH      0.380 - 5.330 uIU/mL       Beta-Hcg Qualitative Serum      Negative       APTT      24.7 - 36.0 sec  29.7     Hepatitis C Antibody      Non-Reactive       HIV Ag/Ab Combo Assay      Non Reactive         Component      Latest Ref Rng & Units 6/10/2021 6/10/2021 6/10/2021 6/10/2021           9:22 AM  9:22 AM  9:22 AM  9:22 AM   WBC      4.8 - 10.8 K/uL       RBC      4.20 - 5.40 M/uL       Hemoglobin       12.0 - 16.0 g/dL       Hematocrit      37.0 - 47.0 %       MCV      81.4 - 97.8 fL       MCH      27.0 - 33.0 pg       MCHC      33.6 - 35.0 g/dL       RDW      35.9 - 50.0 fL       Platelet Count      164 - 446 K/uL       MPV      9.0 - 12.9 fL       Neutrophils-Polys      44.00 - 72.00 %       Lymphocytes      22.00 - 41.00 %       Monocytes      0.00 - 13.40 %       Eosinophils      0.00 - 6.90 %       Basophils      0.00 - 1.80 %       Immature Granulocytes      0.00 - 0.90 %       Nucleated RBC      /100 WBC       Neutrophils (Absolute)      2.00 - 7.15 K/uL       Lymphs (Absolute)      1.00 - 4.80 K/uL       Monos (Absolute)      0.00 - 0.85 K/uL       Eos (Absolute)      0.00 - 0.51 K/uL       Baso (Absolute)      0.00 - 0.12 K/uL       Immature Granulocytes (abs)      0.00 - 0.11 K/uL       NRBC (Absolute)      K/uL       Sodium      135 - 145 mmol/L       Potassium      3.6 - 5.5 mmol/L       Chloride      96 - 112 mmol/L       Co2      20 - 33 mmol/L       Anion Gap      7.0 - 16.0       Glucose      65 - 99 mg/dL       Bun      8 - 22 mg/dL       Creatinine      0.50 - 1.40 mg/dL       Calcium      8.5 - 10.5 mg/dL       AST(SGOT)      12 - 45 U/L       ALT(SGPT)      2 - 50 U/L       Alkaline Phosphatase      30 - 99 U/L       Total Bilirubin      0.1 - 1.5 mg/dL       Albumin      3.2 - 4.9 g/dL       Total Protein      6.0 - 8.2 g/dL       Globulin      1.9 - 3.5 g/dL       A-G Ratio      g/dL       Color        Yellow     Character        Clear     Specific Gravity      <1.035  1.007     Ph      5.0 - 8.0  7.0     Glucose      Negative mg/dL  Negative     Ketones      Negative mg/dL  Negative     Protein      Negative mg/dL  Negative     Bilirubin      Negative  Negative     Urobilinogen, Urine      Negative  0.2     Nitrite      Negative  Negative     Leukocyte Esterase      Negative  Trace (A)     Occult Blood      Negative  Small (A)     Micro Urine Req        Microscopic     WBC      /hpf   0-2     RBC      /hpf   0-2    Bacteria      None /hpf   Negative    Epithelial Cells      /hpf   Negative    Hyaline Cast      /lpf   0-2    Glycohemoglobin      4.0 - 5.6 %       Estim. Avg Glu      mg/dL       PT      12.0 - 14.6 sec       INR      0.87 - 1.13       GFR If African American      >60 mL/min/1.73 m 2 >60      GFR If Non African American      >60 mL/min/1.73 m 2 >60      TSH      0.380 - 5.330 uIU/mL    1.020   Beta-Hcg Qualitative Serum      Negative       APTT      24.7 - 36.0 sec       Hepatitis C Antibody      Non-Reactive       HIV Ag/Ab Combo Assay      Non Reactive         Component      Latest Ref Rng & Units 6/10/2021 6/10/2021 6/10/2021 6/10/2021           9:22 AM  9:22 AM  9:22 AM  9:22 AM   WBC      4.8 - 10.8 K/uL       RBC      4.20 - 5.40 M/uL       Hemoglobin      12.0 - 16.0 g/dL       Hematocrit      37.0 - 47.0 %       MCV      81.4 - 97.8 fL       MCH      27.0 - 33.0 pg       MCHC      33.6 - 35.0 g/dL       RDW      35.9 - 50.0 fL       Platelet Count      164 - 446 K/uL       MPV      9.0 - 12.9 fL       Neutrophils-Polys      44.00 - 72.00 %       Lymphocytes      22.00 - 41.00 %       Monocytes      0.00 - 13.40 %       Eosinophils      0.00 - 6.90 %       Basophils      0.00 - 1.80 %       Immature Granulocytes      0.00 - 0.90 %       Nucleated RBC      /100 WBC       Neutrophils (Absolute)      2.00 - 7.15 K/uL       Lymphs (Absolute)      1.00 - 4.80 K/uL       Monos (Absolute)      0.00 - 0.85 K/uL       Eos (Absolute)      0.00 - 0.51 K/uL       Baso (Absolute)      0.00 - 0.12 K/uL       Immature Granulocytes (abs)      0.00 - 0.11 K/uL       NRBC (Absolute)      K/uL       Sodium      135 - 145 mmol/L       Potassium      3.6 - 5.5 mmol/L       Chloride      96 - 112 mmol/L       Co2      20 - 33 mmol/L       Anion Gap      7.0 - 16.0       Glucose      65 - 99 mg/dL       Bun      8 - 22 mg/dL       Creatinine      0.50 - 1.40 mg/dL       Calcium      8.5 - 10.5 mg/dL      "  AST(SGOT)      12 - 45 U/L       ALT(SGPT)      2 - 50 U/L       Alkaline Phosphatase      30 - 99 U/L       Total Bilirubin      0.1 - 1.5 mg/dL       Albumin      3.2 - 4.9 g/dL       Total Protein      6.0 - 8.2 g/dL       Globulin      1.9 - 3.5 g/dL       A-G Ratio      g/dL       Color             Character             Specific Gravity      <1.035       Ph      5.0 - 8.0       Glucose      Negative mg/dL       Ketones      Negative mg/dL       Protein      Negative mg/dL       Bilirubin      Negative       Urobilinogen, Urine      Negative       Nitrite      Negative       Leukocyte Esterase      Negative       Occult Blood      Negative       Micro Urine Req             WBC      /hpf       RBC      /hpf       Bacteria      None /hpf       Epithelial Cells      /hpf       Hyaline Cast      /lpf       Glycohemoglobin      4.0 - 5.6 %    5.6   Estim. Avg Glu      mg/dL    114   PT      12.0 - 14.6 sec       INR      0.87 - 1.13       GFR If African American      >60 mL/min/1.73 m 2       GFR If Non African American      >60 mL/min/1.73 m 2       TSH      0.380 - 5.330 uIU/mL       Beta-Hcg Qualitative Serum      Negative Negative      APTT      24.7 - 36.0 sec       Hepatitis C Antibody      Non-Reactive  Non-Reactive     HIV Ag/Ab Combo Assay      Non Reactive   Non-Reactive        US-Hernia Abdomen 6/3/2021  \"1.  There is no evidence of abdominal wall hernia.\"    Screening Mammogram 6/10/2021  \"1.  No mammographic evidence of malignancy.  2.  Dense breast tissue with increased breast cancer risk.  3.  Annual screening 3D mammography and annual automated breast ultrasound is recommended.\"    2 view CXR 6/10/2021  \"Normal chest\"    EKG Interpretation   Ordered and interpreted by Sarah Ramírez MD  Rhythm: normal sinus   Rate: bradycardia  Axis: normal   Ectopy: none   Conduction: normal   ST Segments: no acute change   T Waves: V2-V3 inversion  Q Waves: none   Clinical Impression: bradycardia with T wave " inversions V2 & V3    IMPRESSION:  The encounter diagnosis was Preoperative examination.  1. Planned surgery: Breast with possible augmentation and tummy tuck   2. High risk medical conditions: negative for Cardiac, Pulmonary, Bleeding, Poor healing, Thrombosis, Debility    PLAN:  1. Chronic medical conditions: Stable and controlled. Continue current medicines.   2. Avoid drugs that potentiate bleeding as advised by surgeon  3. Discontinue all herbal supplements 2 weeks prior to surgery.  4. Need for SBE prophylaxis: no  5. This patient is considered Very Low risk for cardiopulmonary complications for this planned surgery by the Godfrey index    Godfrey Index for assessing perioperative cardiovascular risk [Circulation 1999;100:1047]:   (one point each for * high-risk surgery [ intrathoracic, suprainguinal vascular, intraperitoneal ],* Hx ischemic heart dz, * Hx CHF, *Hx TIA or CVA, *IDDM, *Serum Cr >2.0)   Interpretation of risk: (Complication = MI, Pulm edema, v-fib or cardiac arrest, complete heart block)  Very Low: 0 points = 0.4 % complication. Low: 1 point = 0.9 %, Moderate: 2 points = 6.6 %, High: 3+ points = 11%.    I spent a total of 35 minutes with record review, exam, communication with the patient, and documentation of this encounter.

## 2021-07-22 ENCOUNTER — APPOINTMENT (OUTPATIENT)
Dept: MEDICAL GROUP | Facility: PHYSICIAN GROUP | Age: 41
End: 2021-07-22
Payer: COMMERCIAL

## 2021-07-28 ENCOUNTER — OFFICE VISIT (OUTPATIENT)
Dept: MEDICAL GROUP | Facility: PHYSICIAN GROUP | Age: 41
End: 2021-07-28
Payer: COMMERCIAL

## 2021-07-28 VITALS
HEART RATE: 98 BPM | SYSTOLIC BLOOD PRESSURE: 122 MMHG | HEIGHT: 59 IN | BODY MASS INDEX: 32.17 KG/M2 | OXYGEN SATURATION: 96 % | DIASTOLIC BLOOD PRESSURE: 78 MMHG | TEMPERATURE: 98.4 F | WEIGHT: 159.6 LBS

## 2021-07-28 DIAGNOSIS — Z48.03 ENCOUNTER FOR CHANGE OR REMOVAL OF DRAINS: ICD-10-CM

## 2021-07-28 DIAGNOSIS — Z09 POSTOPERATIVE EXAMINATION: ICD-10-CM

## 2021-07-28 PROCEDURE — 99212 OFFICE O/P EST SF 10 MIN: CPT | Performed by: FAMILY MEDICINE

## 2021-07-28 ASSESSMENT — FIBROSIS 4 INDEX: FIB4 SCORE: 0.89

## 2021-07-28 NOTE — PROGRESS NOTES
Subjective:     CC: post op    HPI:   Nav presents today with post op follow up.  21 days ago she had surgery for tummy tuck, breast lift, and bilateral breast implants.  She had this procedure done in [place] and was told to have her drains removed postop day 14.  Today is postop day 21. She had 2 drains in the breast but they were removed before leaving [place]. She has 2 drains in the abdominopelvis area that need to be removed.     Left drain has ~10-15 cc from yesterday of serosanguinous drainage.  Right drain has ~20 cc from yesterday of serosanguinous drainage.    She has been in contact with her surgeon and has given him photos of the drain collections and has been told she can get these removed. She cannot start lymphatic massage until the drains are removed.     She reports the post-op pain is management. She has been able to manage with tylenol and advil, down to once per day.     Current Outpatient Medications Ordered in Epic   Medication Sig Dispense Refill   • VENTOLIN  (90 Base) MCG/ACT Aero Soln inhalation aerosol INHALE 2 PUFFS BY MOUTH EVERY FOUR HOURS AS NEEDED FOR SHORTNESS OF BREATH. 2 Each 2   • montelukast (SINGULAIR) 10 MG Tab TAKE 1 TABLET BY MOUTH EVERY DAY 90 tablet 1   • fluticasone-salmeterol (ADVAIR) 250-50 MCG/DOSE AEROSOL POWDER, BREATH ACTIVATED Inhale 1 Puff by mouth every 12 hours. 60 Each 3   • albuterol (PROVENTIL) 2.5mg/3ml Nebu Soln solution for nebulization 3 mL by Nebulization route every four hours as needed for Shortness of Breath. 30 Bullet 2   • citalopram (CELEXA) 20 MG Tab Take 1 Tab by mouth every day. (Patient not taking: Reported on 6/10/2021) 90 Tab 3     No current Epic-ordered facility-administered medications on file.       Health Maintenance: Completed    ROS:  Gen: no fevers/chills  Pulm: no sob  CV: no chest pain    Objective:     Exam:  /78 (BP Location: Right arm, Patient Position: Sitting, BP Cuff Size: Adult)   Pulse 98   Temp 36.9 °C  "(98.4 °F) (Temporal)   Ht 1.499 m (4' 11\")   Wt 72.4 kg (159 lb 9.6 oz)   SpO2 96%   BMI 32.24 kg/m²  Body mass index is 32.24 kg/m².    Gen: Alert and oriented, No apparent distress.  Neck: Neck is supple without lymphadenopathy.  Lungs: Normal effort, CTA bilaterally, no wheezes, rhonchi, or rales  CV: Regular rate and rhythm. No murmurs, rubs, or gallops.  Chest/Abdomen: Breast incisions bilaterally C/D/I, lower abdominal incisions bilaterally C/D/I. 2 NATHALIE drains in place superior inguinal region bilaterally  Ext: No clubbing, cyanosis, edema.    Assessment & Plan:     41 y.o. female with the following -     1. Postoperative examination  2. Encounter for change or removal of drains  She is here today for her postop examination and NATHALIE drain removal.  21 days ago she had a bilateral breast lift and implant surgery along with an extended tummy tuck.  She did have 2 drains placed for the breast surgery but those were removed prior to coming back to [place] from [place] where the surgery took place.  She still has 2 NATHALIE drains in place right at the upper aspect of the inguinal canal bilaterally.  Both still have a fair bit of drainage collected from yesterday, left approximately 10-15 cc and right approximately 20 cc.  She reports that she is been in communication with the surgeon and has had shown photos of the collection and the surgeon told her it was time to remove the drains.  Sutures and drains were removed today without any complication.  She will continue her postoperative recovery course per her surgeon's directions.    I spent a total of 23 minutes with record review, exam, communication with the patient, and documentation of this encounter.    Return in about 5 months (around 12/28/2021) for Med check.    Please note that this dictation was created using voice recognition software. I have made every reasonable attempt to correct obvious errors, but I expect that there are errors of grammar and possibly " content that I did not discover before finalizing the note.

## 2021-09-22 NOTE — PROGRESS NOTES
Subjective:     CC:  Diagnoses of Moderate persistent asthma without complication, Major depressive disorder, recurrent episode, in full remission (HCC), Attention deficit hyperactivity disorder (ADHD), predominantly inattentive type, Encounter for screening mammogram for malignant neoplasm of breast, and Need for vaccination were pertinent to this visit.    HISTORY OF THE PRESENT ILLNESS: Patient is a 39 y.o. female. This pleasant patient is here today to establish care and discuss asthma. Her prior PCP was with [facility].    Moderate persistent asthma  This is a chronic condition. Her symptoms are usually triggered by allergies. Symptoms are only triggered at work because of all the dust.  Onset: Symptoms present since age 5  Symptoms include:  Cough: yes  Wheezing: yes  Details: diffuse  Problems with exercise induced coughing, wheezing, or shortness of breath?  No  Has sleep been disturbed due to symptoms: No  How often have you had to use your albuterol for relief of symptoms?  daily  Current medications: montelukast 10 mg - drowsiness so not taking regularly, albuterol as needed    She has been on QVAR in the past without much relief.       Major depressive disorder, recurrent episode, in full remission (HCC)  This is a chronic condition.  Onset: 2015  PHQ screen: 0  Suicidal ideation/homicidal ideation: no/no  Therapy/counseling: in past, not currently  Medications: citalopram 20 mg daily  Improving/worsening: well-controlled      Attention deficit hyperactivity disorder (ADHD), predominantly inattentive type  This is a chronic condition. She does get some ADHD but she is able to help herself focus and control it with meditation, deep breathing, self care, and other techniques.       Allergies: Patient has no known allergies.    Current Outpatient Medications Ordered in Epic   Medication Sig Dispense Refill   • Fluticasone Furoate (ARNUITY ELLIPTA) 100 MCG/ACT AEROSOL POWDER, BREATH ACTIVATED  "Inhale 1 Inhalation by mouth every day. 1 Each 1   • albuterol 108 (90 Base) MCG/ACT Aero Soln inhalation aerosol Inhale 2 Puffs by mouth every four hours as needed for Shortness of Breath. 2 Inhaler 3   • citalopram (CELEXA) 20 MG Tab Take 20 mg by mouth every day.     • albuterol (PROVENTIL) 2.5mg/3ml Nebu Soln solution for nebulization Inhale  by mouth.       No current Epic-ordered facility-administered medications on file.        History reviewed. No pertinent past medical history.    Past Surgical History:   Procedure Laterality Date   • CARPAL TUNNEL RELEASE Bilateral 2019 September, October       Social History     Tobacco Use   • Smoking status: Never Smoker   • Smokeless tobacco: Never Used   Substance Use Topics   • Alcohol use: No   • Drug use: No       Social History     Social History Narrative   • Not on file       Family History   Problem Relation Age of Onset   • Lupus Mother    • Heart Disease Father    • No Known Problems Sister    • Cancer Maternal Aunt         breast   • Cancer Maternal Grandmother         breast   • Stroke Maternal Grandmother    • No Known Problems Sister    • No Known Problems Sister    • No Known Problems Sister    • Cancer Maternal Aunt         brain   • Diabetes Neg Hx    • Alcohol abuse Neg Hx    • Drug abuse Neg Hx        Health Maintenance: Completed    ROS:   Gen: no fevers/chills  Eyes: no changes in vision, + double vision  ENT: no hearing loss  Pulm: no cough  CV: no palpitations  GI:  no diarrhea  : no dysuria  MSk: no myalgias  Skin: no rash  Neuro: no numbness/tingling      Objective:     Exam: /78 (BP Location: Left arm, Patient Position: Sitting, BP Cuff Size: Adult)   Pulse 64   Temp 36.8 °C (98.2 °F) (Temporal)   Resp 16   Ht 1.499 m (4' 11\")   Wt 77.3 kg (170 lb 6.4 oz)   SpO2 97%  Body mass index is 34.42 kg/m².    General: Normal appearing. No distress.  HEENT: Normocephalic. Eyes conjunctiva clear lids without ptosis, pupils equal and " reactive to light accommodation, ears normal shape and contour, canals are clear bilaterally, tympanic membranes are benign, oropharynx is without erythema, edema or exudates.   Neck: Supple without JVD. Thyroid is not enlarged.  Pulmonary: Clear to ausculation.  Normal effort. No rales, ronchi, or wheezing.  Cardiovascular: Regular rate and rhythm without murmur. Carotid and radial pulses are intact and equal bilaterally.  Abdomen: Soft, nontender, nondistended. Normal bowel sounds. Liver and spleen are not palpable  Neurologic: Grossly nonfocal  Lymph: No cervical or supraclavicular lymph nodes are palpable  Skin: Warm and dry.  No obvious lesions.  Musculoskeletal: Normal gait. No extremity cyanosis, clubbing, or edema.  Psych: Normal mood and affect. Alert and oriented x3. Judgment and insight is normal.    Assessment & Plan:   39 y.o. female with the following -    1. Moderate persistent asthma without complication  This is a chronic condition, currently uncontrolled.  She has a longstanding history of asthma that is triggered by allergies.  She is allergic to dust pretty significantly which leads to asthma exacerbations.  She is working with [employer] and there is a lot of dust in the workplace so she is having to use her albuterol daily to treat her cough and wheezing.  She was provided an N 95 mask by the work but she cannot breathe through the mask otherwise.  She has montelukast to use to prevent but she has not been taking it because it makes her extremely drowsy which makes it difficult to work.  After discussion she would like to try a daily inhaler to see if it prevents exacerbations without the montelukast.  -Start Arnuity Ellipta 100 mcg daily  -Continue albuterol as needed    2. Major depressive disorder, recurrent episode, in full remission (HCC)  This is a chronic condition, in full remission.  She has had depression since approximately 2015.  She is on citalopram but reports it is controlling her  depression very well and she had no symptoms with the PHQ questionnaire today.  She used to see therapist before she moved here and she would like to establish with a new therapist.  She denies any suicidal or homicidal ideation today.  -Continue citalopram 20 mg daily  - REFERRAL TO BEHAVIORAL HEALTH    3. Attention deficit hyperactivity disorder (ADHD), predominantly inattentive type  This is a chronic condition, stable.  She reports that she has had ADHD for many years but she is been able to manage it very well without any medication.  She uses meditation, deep breathing, self-care, and other techniques which works really well for her.  - REFERRAL TO BEHAVIORAL HEALTH    4. Encounter for screening mammogram for malignant neoplasm of breast  - MA-SCREENING MAMMO BILAT W/CAD; Future    5. Need for vaccination  - Influenza Vaccine Quad Injection (PF)      Return in about 4 weeks (around 4/10/2020) for f/u asthma.    Please note that this dictation was created using voice recognition software. I have made every reasonable attempt to correct obvious errors, but I expect that there are errors of grammar and possibly content that I did not discover before finalizing the note.   none

## 2021-11-11 ENCOUNTER — HOSPITAL ENCOUNTER (OUTPATIENT)
Dept: RADIOLOGY | Facility: MEDICAL CENTER | Age: 41
End: 2021-11-11
Attending: FAMILY MEDICINE
Payer: COMMERCIAL

## 2021-11-11 DIAGNOSIS — R19.00 ABDOMINAL SWELLING: ICD-10-CM

## 2021-11-11 PROCEDURE — 76705 ECHO EXAM OF ABDOMEN: CPT

## 2021-11-12 ENCOUNTER — OFFICE VISIT (OUTPATIENT)
Dept: MEDICAL GROUP | Facility: PHYSICIAN GROUP | Age: 41
End: 2021-11-12
Payer: COMMERCIAL

## 2021-11-12 VITALS
HEIGHT: 59 IN | TEMPERATURE: 98.6 F | RESPIRATION RATE: 16 BRPM | WEIGHT: 166 LBS | DIASTOLIC BLOOD PRESSURE: 82 MMHG | SYSTOLIC BLOOD PRESSURE: 110 MMHG | HEART RATE: 80 BPM | OXYGEN SATURATION: 97 % | BODY MASS INDEX: 33.47 KG/M2

## 2021-11-12 DIAGNOSIS — F41.1 GENERALIZED ANXIETY DISORDER: ICD-10-CM

## 2021-11-12 PROCEDURE — 99213 OFFICE O/P EST LOW 20 MIN: CPT | Performed by: PHYSICIAN ASSISTANT

## 2021-11-12 ASSESSMENT — FIBROSIS 4 INDEX: FIB4 SCORE: 0.89

## 2021-11-12 NOTE — PROGRESS NOTES
Chief Complaint   Patient presents with   • Letter for School/Work       HISTORY OF PRESENT ILLNESS: Nav Coates is an established 41 y.o. female here to discuss the evaluation and management of:    Patient is a pleasant 41-year-old female here today requesting an excuse for work due to an exacerbation of anxiety.  Patient states she missed work on 11/9/2021 and 11/10/2021 due to exacerbation of anxiety.  States she woke up on Tuesday morning 11/9/2021 and was getting ready for work and started having a panic attack just by thinking about going to work.  Patient states she called in and does not have to return to work until this Sunday.  She tells me that she is being bullied at work and it is making her anxiety worse.  States she has a meeting with HR on this Sunday to discuss being transferred to a different area within [facility] and to discuss what is been going on at work.  Per chart review patient has a positive medical history for anxiety.  In the past she was prescribed Celexa 20 mg once daily.  She states she stopped medication 1 year ago and had been developing healthy coping mechanisms for anxiety.  She does not want to go back on Celexa or an antianxiety medication at this time.  States she has an appointment with therapy on 11/19/2021 and states she has not been seen by therapist in over a year.  She denies exercising regularly.  She admits to practicing breathing techniques.  Denies homicidal or suicidal ideation.      Patient Active Problem List    Diagnosis Date Noted   • Bilateral carpal tunnel syndrome 04/10/2019   • Major depressive disorder, recurrent episode, in full remission (HCC) 02/03/2016   • Obesity (BMI 30-39.9) 02/03/2016   • Chronic low back pain 12/15/2015   • Anxiety disorder 10/15/2015   • Attention deficit hyperactivity disorder (ADHD), predominantly inattentive type 10/26/2005   • Allergic rhinitis 11/03/2004   • Moderate persistent asthma 11/03/2004       Allergies:Patient has  no known allergies.    Current Outpatient Medications   Medication Sig Dispense Refill   • meloxicam (MOBIC) 15 MG tablet Take 1 Tablet by mouth every day for 30 days. 30 Tablet 0   • VENTOLIN  (90 Base) MCG/ACT Aero Soln inhalation aerosol INHALE 2 PUFFS BY MOUTH EVERY FOUR HOURS AS NEEDED FOR SHORTNESS OF BREATH. 2 Each 2   • montelukast (SINGULAIR) 10 MG Tab TAKE 1 TABLET BY MOUTH EVERY DAY 90 tablet 1   • citalopram (CELEXA) 20 MG Tab Take 1 Tab by mouth every day. (Patient not taking: Reported on 6/10/2021) 90 Tab 3   • fluticasone-salmeterol (ADVAIR) 250-50 MCG/DOSE AEROSOL POWDER, BREATH ACTIVATED Inhale 1 Puff by mouth every 12 hours. 60 Each 3   • albuterol (PROVENTIL) 2.5mg/3ml Nebu Soln solution for nebulization 3 mL by Nebulization route every four hours as needed for Shortness of Breath. 30 Bullet 2     No current facility-administered medications for this visit.       Social History     Tobacco Use   • Smoking status: Never Smoker   • Smokeless tobacco: Never Used   Vaping Use   • Vaping Use: Never used   Substance Use Topics   • Alcohol use: No   • Drug use: No       Family Status   Relation Name Status   • Mo  Alive   • Fa     • Sis  Alive   • MAunt  (Not Specified)   • MGMo  (Not Specified)   • Sis  Alive   • Sis  Alive   • Sis  Alive   • MAunt  (Not Specified)   • Neg Hx  (Not Specified)     Family History   Problem Relation Age of Onset   • Lupus Mother    • Heart Disease Father    • No Known Problems Sister    • Cancer Maternal Aunt         breast   • Cancer Maternal Grandmother         breast   • Stroke Maternal Grandmother    • No Known Problems Sister    • No Known Problems Sister    • No Known Problems Sister    • Cancer Maternal Aunt         brain   • Diabetes Neg Hx    • Alcohol abuse Neg Hx    • Drug abuse Neg Hx        ROS:  Review of Systems   Constitutional: Negative for fever, chills, weight loss and malaise/fatigue.   HENT: Negative for ear pain, nosebleeds, congestion,  "sore throat and neck pain.    Eyes: Negative for blurred vision.   Respiratory: Negative for cough, sputum production, shortness of breath and wheezing.    Cardiovascular: Negative for chest pain, palpitations, orthopnea and leg swelling.   Gastrointestinal: Negative for heartburn, nausea, vomiting and abdominal pain.   Genitourinary: Negative for dysuria, urgency and frequency.   Musculoskeletal: Negative for myalgias, back pain and joint pain.   Skin: Negative for rash and itching.   Neurological: Negative for dizziness, tingling, tremors, sensory change, focal weakness and headaches.   Endo/Heme/Allergies: Does not bruise/bleed easily.   Psychiatric/Behavioral: Negative for depression, suicidal ideas and memory loss.   All other systems reviewed and are negative except as in HPI.    Exam: /82 (BP Location: Left arm, Patient Position: Sitting, BP Cuff Size: Adult)   Pulse 80   Temp 37 °C (98.6 °F) (Temporal)   Resp 16   Ht 1.499 m (4' 11\")   Wt 75.3 kg (166 lb)   SpO2 97%  Body mass index is 33.53 kg/m².  General: Normal appearing. Patient is tearful.  HEENT: Normocephalic. Eyes conjunctiva clear lids without ptosis, ears normal shape and contour.  Neck: Supple without JVD or bruit. Thyroid is not enlarged.  Pulmonary: Clear to ausculation.  Normal effort. No rales, ronchi, or wheezing.  Cardiovascular: Regular rate and rhythm without murmur.  Abdomen: Nondistended.   Neurologic: Grossly nonfocal.  Cranial nerves are normal.   Skin: Warm and dry.  No rashes or suspicious skin lesions.  Musculoskeletal: Normal gait. No extremity cyanosis, clubbing, or edema.  Psych: Normal mood and affect. Alert and oriented x3. Judgment and insight is normal.    Medical decision-making and discussion:  1. Generalized anxiety disorder  Chronic problem.  Uncontrolled.  At the end of her appointment patient started to have a panic attack from talking about her current work situation.  Patient was able to calm down by deep " breathing and drinking water.   Patient has been given a letter requesting that she be excused from work on 11/9/2021 and 11/10/2021.  Advised patient to work on diet, exercise, sleep hygiene, hydration, developing healthy coping mechanisms for stress anxiety.  Advised patient to follow-up with her therapist as indicated.  Discussed the importance of being transparent with HR.  Advised patient if anxiety symptoms do not improve after seeing her therapist to make a follow-up appoint with her PCP discuss treatment options.    Denies any suicidal or homicidal ideation.  Discussed that should the patient have any symptoms they should call suicide prevention hotline or report to the emergency room immediately.    Follow-up for worsening symptoms,lack of expected recovery, or should new symptoms or problems arise.      Please note that this dictation was created using voice recognition software. I have made every reasonable attempt to correct obvious errors, but I expect that there are errors of grammar and possibly content that I did not discover before finalizing the note.    Assessment/Plan:  1. Generalized anxiety disorder         Return if symptoms worsen or fail to improve.

## 2021-11-12 NOTE — LETTER
November 12, 2021    To Whom It May Concern:         This is confirmation that Nav Coates attended her scheduled appointment with Shantal Marin P.A.-C. on 11/12/21. Please excuse Nav from work on 11/09/21 -11/10/2021 due to an acute exacerbation of a chronic health condition.          If you have any questions please do not hesitate to call me at the phone number listed below.    Sincerely,          Shantal Marin P.A.-C.  [phone]

## 2022-01-18 ENCOUNTER — HOSPITAL ENCOUNTER (OUTPATIENT)
Dept: LAB | Facility: MEDICAL CENTER | Age: 42
End: 2022-01-18
Attending: FAMILY MEDICINE
Payer: COMMERCIAL

## 2022-01-18 ENCOUNTER — OFFICE VISIT (OUTPATIENT)
Dept: MEDICAL GROUP | Facility: PHYSICIAN GROUP | Age: 42
End: 2022-01-18
Payer: COMMERCIAL

## 2022-01-18 ENCOUNTER — HOSPITAL ENCOUNTER (OUTPATIENT)
Facility: MEDICAL CENTER | Age: 42
End: 2022-01-18
Attending: FAMILY MEDICINE
Payer: COMMERCIAL

## 2022-01-18 VITALS
OXYGEN SATURATION: 96 % | TEMPERATURE: 98.1 F | HEIGHT: 59 IN | SYSTOLIC BLOOD PRESSURE: 108 MMHG | HEART RATE: 68 BPM | BODY MASS INDEX: 34.15 KG/M2 | DIASTOLIC BLOOD PRESSURE: 66 MMHG | RESPIRATION RATE: 16 BRPM | WEIGHT: 169.4 LBS

## 2022-01-18 DIAGNOSIS — Z11.3 SCREENING EXAMINATION FOR SEXUALLY TRANSMITTED DISEASE: ICD-10-CM

## 2022-01-18 DIAGNOSIS — Z23 NEED FOR VACCINATION: ICD-10-CM

## 2022-01-18 DIAGNOSIS — N91.2 AMENORRHEA: ICD-10-CM

## 2022-01-18 DIAGNOSIS — N89.8 VAGINAL IRRITATION: ICD-10-CM

## 2022-01-18 LAB
HIV 1+2 AB+HIV1 P24 AG SERPL QL IA: NORMAL
INT CON NEG: NORMAL
INT CON POS: NORMAL
POC URINE PREGNANCY TEST: NEGATIVE
TREPONEMA PALLIDUM IGG+IGM AB [PRESENCE] IN SERUM OR PLASMA BY IMMUNOASSAY: NORMAL

## 2022-01-18 PROCEDURE — 87480 CANDIDA DNA DIR PROBE: CPT

## 2022-01-18 PROCEDURE — 90686 IIV4 VACC NO PRSV 0.5 ML IM: CPT | Performed by: FAMILY MEDICINE

## 2022-01-18 PROCEDURE — 90471 IMMUNIZATION ADMIN: CPT | Performed by: FAMILY MEDICINE

## 2022-01-18 PROCEDURE — 36415 COLL VENOUS BLD VENIPUNCTURE: CPT

## 2022-01-18 PROCEDURE — 87389 HIV-1 AG W/HIV-1&-2 AB AG IA: CPT

## 2022-01-18 PROCEDURE — 87660 TRICHOMONAS VAGIN DIR PROBE: CPT

## 2022-01-18 PROCEDURE — 86780 TREPONEMA PALLIDUM: CPT

## 2022-01-18 PROCEDURE — 99213 OFFICE O/P EST LOW 20 MIN: CPT | Mod: 25 | Performed by: FAMILY MEDICINE

## 2022-01-18 PROCEDURE — 87591 N.GONORRHOEAE DNA AMP PROB: CPT

## 2022-01-18 PROCEDURE — 81025 URINE PREGNANCY TEST: CPT | Performed by: FAMILY MEDICINE

## 2022-01-18 PROCEDURE — 87510 GARDNER VAG DNA DIR PROBE: CPT

## 2022-01-18 PROCEDURE — 87491 CHLMYD TRACH DNA AMP PROBE: CPT

## 2022-01-18 ASSESSMENT — ENCOUNTER SYMPTOMS
CHILLS: 0
FEVER: 0
SHORTNESS OF BREATH: 0

## 2022-01-18 ASSESSMENT — FIBROSIS 4 INDEX: FIB4 SCORE: 0.89

## 2022-01-18 NOTE — ASSESSMENT & PLAN NOTE
Acute.  At the end of her visit she did mention that it has been 30 days since her last period which is not normal for her.  She reports she is usually very consistent with her cycle.  She was sexually active approximately 18 days ago.  POCT pregnancy test negative.  She may be entering perimenopause so we will continue to monitor for now.

## 2022-01-18 NOTE — ASSESSMENT & PLAN NOTE
Acute.  For almost 3 weeks she has had itching of her labia, vaginal irritation, and a slight increase in her vaginal discharge but no appreciable change in color or odor.  I have ordered an STI panel and the affirm to evaluate for infection as a source of her symptoms.

## 2022-01-18 NOTE — PROGRESS NOTES
"Subjective:     CC: vaginal irritation, STI check    HPI:   Nav presents today with    Problem   Vaginal Irritation    Onset: 1/1/2022 (last time she had intercourse)  Quality: itching of labia, vaginal irritation, vaginal discharge (more than usual, no change in color or odor)    She also wants to get an STI check.     Amenorrhea       Health Maintenance: Completed    ROS:  Review of Systems   Constitutional: Negative for chills and fever.   Respiratory: Negative for shortness of breath.    Cardiovascular: Negative for chest pain.       Objective:     Exam:  /66 (BP Location: Left arm, Patient Position: Sitting, BP Cuff Size: Adult)   Pulse 68   Temp 36.7 °C (98.1 °F) (Temporal)   Resp 16   Ht 1.499 m (4' 11\")   Wt 76.8 kg (169 lb 6.4 oz)   SpO2 96%   BMI 34.21 kg/m²  Body mass index is 34.21 kg/m².    Physical Exam  Constitutional:       Appearance: Normal appearance.   Cardiovascular:      Rate and Rhythm: Normal rate and regular rhythm.      Heart sounds: Normal heart sounds.   Pulmonary:      Effort: Pulmonary effort is normal.      Breath sounds: Normal breath sounds.   Musculoskeletal:      Cervical back: Normal range of motion and neck supple.   Neurological:      Mental Status: She is alert.         Assessment & Plan:     41 y.o. female with the following -     Problem List Items Addressed This Visit     Vaginal irritation     Acute.  For almost 3 weeks she has had itching of her labia, vaginal irritation, and a slight increase in her vaginal discharge but no appreciable change in color or odor.  I have ordered an STI panel and the affirm to evaluate for infection as a source of her symptoms.         Relevant Orders    VAGINAL PATHOGENS DNA PANEL    Amenorrhea     Acute.  At the end of her visit she did mention that it has been 30 days since her last period which is not normal for her.  She reports she is usually very consistent with her cycle.  She was sexually active approximately 18 days " ago.  POCT pregnancy test negative.  She may be entering perimenopause so we will continue to monitor for now.         Relevant Orders    POCT PREGNANCY (Completed)      Other Visit Diagnoses     Screening examination for sexually transmitted disease        Relevant Orders    T.PALLIDUM AB EIA    HIV AG/AB COMBO ASSAY SCREENING    Chlamydia/GC PCR Urine Or Swab    Need for vaccination        Relevant Orders    INFLUENZA VACCINE QUAD INJ (PF) (Completed)          Return in about 6 months (around 7/18/2022) for Annual/wellness visit.    Please note that this dictation was created using voice recognition software. I have made every reasonable attempt to correct obvious errors, but I expect that there are errors of grammar and possibly content that I did not discover before finalizing the note.

## 2022-01-19 DIAGNOSIS — N89.8 VAGINAL IRRITATION: ICD-10-CM

## 2022-01-19 RX ORDER — METRONIDAZOLE 500 MG/1
500 TABLET ORAL 2 TIMES DAILY
Qty: 14 TABLET | Refills: 0 | Status: SHIPPED | OUTPATIENT
Start: 2022-01-19 | End: 2022-01-26

## 2022-02-28 ENCOUNTER — HOSPITAL ENCOUNTER (OUTPATIENT)
Dept: RADIOLOGY | Facility: MEDICAL CENTER | Age: 42
End: 2022-02-28
Attending: PLASTIC SURGERY
Payer: COMMERCIAL

## 2022-02-28 ENCOUNTER — HOSPITAL ENCOUNTER (OUTPATIENT)
Dept: LAB | Facility: MEDICAL CENTER | Age: 42
End: 2022-02-28
Attending: PLASTIC SURGERY
Payer: COMMERCIAL

## 2022-02-28 DIAGNOSIS — Z01.818 PREOP EXAMINATION: ICD-10-CM

## 2022-02-28 LAB
ALBUMIN SERPL BCP-MCNC: 4.4 G/DL (ref 3.2–4.9)
ALBUMIN/GLOB SERPL: 1.4 G/DL
ALP SERPL-CCNC: 55 U/L (ref 30–99)
ALT SERPL-CCNC: 12 U/L (ref 2–50)
ANION GAP SERPL CALC-SCNC: 11 MMOL/L (ref 7–16)
APPEARANCE UR: ABNORMAL
APTT PPP: 31.5 SEC (ref 24.7–36)
AST SERPL-CCNC: 20 U/L (ref 12–45)
BACTERIA #/AREA URNS HPF: ABNORMAL /HPF
BASOPHILS # BLD AUTO: 1.1 % (ref 0–1.8)
BASOPHILS # BLD: 0.04 K/UL (ref 0–0.12)
BILIRUB SERPL-MCNC: 0.2 MG/DL (ref 0.1–1.5)
BILIRUB UR QL STRIP.AUTO: NEGATIVE
BUN SERPL-MCNC: 11 MG/DL (ref 8–22)
CALCIUM SERPL-MCNC: 9.6 MG/DL (ref 8.5–10.5)
CHLORIDE SERPL-SCNC: 101 MMOL/L (ref 96–112)
CO2 SERPL-SCNC: 27 MMOL/L (ref 20–33)
COLOR UR: YELLOW
CREAT SERPL-MCNC: 0.74 MG/DL (ref 0.5–1.4)
EOSINOPHIL # BLD AUTO: 0.23 K/UL (ref 0–0.51)
EOSINOPHIL NFR BLD: 6.3 % (ref 0–6.9)
EPI CELLS #/AREA URNS HPF: ABNORMAL /HPF
ERYTHROCYTE [DISTWIDTH] IN BLOOD BY AUTOMATED COUNT: 41.1 FL (ref 35.9–50)
EST. AVERAGE GLUCOSE BLD GHB EST-MCNC: 114 MG/DL
FASTING STATUS PATIENT QL REPORTED: NORMAL
GLOBULIN SER CALC-MCNC: 3.2 G/DL (ref 1.9–3.5)
GLUCOSE SERPL-MCNC: 92 MG/DL (ref 65–99)
GLUCOSE UR STRIP.AUTO-MCNC: NEGATIVE MG/DL
HBA1C MFR BLD: 5.6 % (ref 4–5.6)
HCG SERPL QL: NEGATIVE
HCT VFR BLD AUTO: 43.7 % (ref 37–47)
HCV AB SER QL: NORMAL
HGB BLD-MCNC: 14.3 G/DL (ref 12–16)
HIV 1+2 AB+HIV1 P24 AG SERPL QL IA: NORMAL
IMM GRANULOCYTES # BLD AUTO: 0.01 K/UL (ref 0–0.11)
IMM GRANULOCYTES NFR BLD AUTO: 0.3 % (ref 0–0.9)
INR PPP: 0.98 (ref 0.87–1.13)
KETONES UR STRIP.AUTO-MCNC: NEGATIVE MG/DL
LEUKOCYTE ESTERASE UR QL STRIP.AUTO: ABNORMAL
LYMPHOCYTES # BLD AUTO: 1.56 K/UL (ref 1–4.8)
LYMPHOCYTES NFR BLD: 43 % (ref 22–41)
MCH RBC QN AUTO: 29.2 PG (ref 27–33)
MCHC RBC AUTO-ENTMCNC: 32.7 G/DL (ref 33.6–35)
MCV RBC AUTO: 89.2 FL (ref 81.4–97.8)
MICRO URNS: ABNORMAL
MONOCYTES # BLD AUTO: 0.28 K/UL (ref 0–0.85)
MONOCYTES NFR BLD AUTO: 7.7 % (ref 0–13.4)
NEUTROPHILS # BLD AUTO: 1.51 K/UL (ref 2–7.15)
NEUTROPHILS NFR BLD: 41.6 % (ref 44–72)
NITRITE UR QL STRIP.AUTO: NEGATIVE
NRBC # BLD AUTO: 0 K/UL
NRBC BLD-RTO: 0 /100 WBC
PH UR STRIP.AUTO: 6.5 [PH] (ref 5–8)
PLATELET # BLD AUTO: 280 K/UL (ref 164–446)
PMV BLD AUTO: 11.7 FL (ref 9–12.9)
POTASSIUM SERPL-SCNC: 3.8 MMOL/L (ref 3.6–5.5)
PROT SERPL-MCNC: 7.6 G/DL (ref 6–8.2)
PROT UR QL STRIP: NEGATIVE MG/DL
PROTHROMBIN TIME: 12.7 SEC (ref 12–14.6)
RBC # BLD AUTO: 4.9 M/UL (ref 4.2–5.4)
RBC # URNS HPF: ABNORMAL /HPF
RBC UR QL AUTO: ABNORMAL
SODIUM SERPL-SCNC: 139 MMOL/L (ref 135–145)
SP GR UR STRIP.AUTO: 1.02
TSH SERPL DL<=0.005 MIU/L-ACNC: 0.91 UIU/ML (ref 0.38–5.33)
UROBILINOGEN UR STRIP.AUTO-MCNC: 0.2 MG/DL
WBC # BLD AUTO: 3.6 K/UL (ref 4.8–10.8)
WBC #/AREA URNS HPF: ABNORMAL /HPF

## 2022-02-28 PROCEDURE — 81001 URINALYSIS AUTO W/SCOPE: CPT

## 2022-02-28 PROCEDURE — 85610 PROTHROMBIN TIME: CPT

## 2022-02-28 PROCEDURE — 71046 X-RAY EXAM CHEST 2 VIEWS: CPT

## 2022-02-28 PROCEDURE — 85025 COMPLETE CBC W/AUTO DIFF WBC: CPT

## 2022-02-28 PROCEDURE — 84443 ASSAY THYROID STIM HORMONE: CPT

## 2022-02-28 PROCEDURE — 86803 HEPATITIS C AB TEST: CPT

## 2022-02-28 PROCEDURE — 85730 THROMBOPLASTIN TIME PARTIAL: CPT

## 2022-02-28 PROCEDURE — 84703 CHORIONIC GONADOTROPIN ASSAY: CPT

## 2022-02-28 PROCEDURE — 80053 COMPREHEN METABOLIC PANEL: CPT

## 2022-02-28 PROCEDURE — 87086 URINE CULTURE/COLONY COUNT: CPT

## 2022-02-28 PROCEDURE — 83036 HEMOGLOBIN GLYCOSYLATED A1C: CPT

## 2022-02-28 PROCEDURE — 87389 HIV-1 AG W/HIV-1&-2 AB AG IA: CPT

## 2022-02-28 PROCEDURE — 36415 COLL VENOUS BLD VENIPUNCTURE: CPT

## 2022-03-01 ENCOUNTER — OFFICE VISIT (OUTPATIENT)
Dept: MEDICAL GROUP | Facility: PHYSICIAN GROUP | Age: 42
End: 2022-03-01
Payer: COMMERCIAL

## 2022-03-01 VITALS
BODY MASS INDEX: 33.14 KG/M2 | HEART RATE: 76 BPM | SYSTOLIC BLOOD PRESSURE: 110 MMHG | TEMPERATURE: 97.6 F | HEIGHT: 59 IN | WEIGHT: 164.4 LBS | DIASTOLIC BLOOD PRESSURE: 70 MMHG | OXYGEN SATURATION: 99 % | RESPIRATION RATE: 16 BRPM

## 2022-03-01 DIAGNOSIS — Z01.818 PREOPERATIVE EXAMINATION: Primary | ICD-10-CM

## 2022-03-01 PROBLEM — N89.8 VAGINAL IRRITATION: Status: RESOLVED | Noted: 2022-01-18 | Resolved: 2022-03-01

## 2022-03-01 PROCEDURE — 93000 ELECTROCARDIOGRAM COMPLETE: CPT | Performed by: FAMILY MEDICINE

## 2022-03-01 PROCEDURE — 99213 OFFICE O/P EST LOW 20 MIN: CPT | Mod: 25 | Performed by: FAMILY MEDICINE

## 2022-03-01 ASSESSMENT — FIBROSIS 4 INDEX: FIB4 SCORE: 0.89

## 2022-03-01 NOTE — PROGRESS NOTES
REASON FOR VISIT: Pre-Op Consultation  Consultation Requested by: Dr. Dacia Dubois  Procedure date and type: Liposuction and Seroma Removal; 4/27/2022  Inherent cardiac risk of procedure: low    History of condition for which surgery is planned: She had tummy tuck, breast lift, and bilateral breast implants (saline, 320 cc) performed in July, 2021. She developed an abdominal seroma. In order to fix it, she will also be getting liposuction with the original surgeon.    Current chronic conditions: does not have any pertinent problems on file.    Past medical history:  has no past medical history on file.. Negative for: CAD, SBE, CVA, TIA, DVT, PE, bleeding requiring transfusion, intubation.    Surgical and anesthetic history:  has a past surgical history that includes carpal tunnel release (Bilateral, 2019). Prior surgery without bleeding, reaction to anesthetic, prolonged recovery  +Complication - seroma following tummy tuck.    Habits:   Social History     Tobacco Use   • Smoking status: Never Smoker   • Smokeless tobacco: Never Used   Vaping Use   • Vaping Use: Never used   Substance Use Topics   • Alcohol use: No   • Drug use: No       Allergies: Patient has no known allergies. No known allergy to Anesthetic, or Latex.       Current medicines:   Current Outpatient Medications   Medication Sig Dispense Refill   • ADVAIR DISKUS 250-50 MCG/DOSE AEROSOL POWDER, BREATH ACTIVATED TAKE 1 PUFF BY MOUTH EVERY 12 HOURS 60 Each 3   • VENTOLIN  (90 Base) MCG/ACT Aero Soln inhalation aerosol INHALE 2 PUFFS BY MOUTH EVERY FOUR HOURS AS NEEDED FOR SHORTNESS OF BREATH. 2 Each 2   • montelukast (SINGULAIR) 10 MG Tab TAKE 1 TABLET BY MOUTH EVERY DAY 90 tablet 1   • albuterol (PROVENTIL) 2.5mg/3ml Nebu Soln solution for nebulization 3 mL by Nebulization route every four hours as needed for Shortness of Breath. 30 Bullet 2     No current facility-administered medications for this visit.       Anticoagulant: ASA, NSAIDs   "  Herbals: Denies Black Cohash, Echinacea, Garlic, Peace, Ginkgo Biloba, Ginseng, Kava, Haydee’s Wort,  Saw Palmetto, Priscillan               [place] Activity Status Index: 36.7  METS: 7.25, moderate functional capacity  ASA Class: 2    ROS: negative for: CP, SOB, SHAH, Orthopnea, wheezing, leg edema, polydipsia, polyuria, fevers, chills, sweats, cough, cold, congestion, abd pain, reflux, black or bloody stools, weight loss/gain.    PHYSICAL EXAMINATION:  VITAL SIGNS: /70 (BP Location: Left arm, Patient Position: Sitting, BP Cuff Size: Adult)   Pulse 76   Temp 36.4 °C (97.6 °F) (Temporal)   Resp 16   Ht 1.499 m (4' 11\")   Wt 74.6 kg (164 lb 6.4 oz)   SpO2 99%  Body mass index is 33.2 kg/m².  HEENT: EOMI, PERRL. Oropharynx pink, moist. Normal airway. Neck supple, no cervical lymphadenopathy.  LUNGS: CTAB good excursion.   HEART: RRR no murmur.  ABDOMEN: soft, nondistended, nontender normal BS. No HSM.  LOWER EXTREMITIES: warm and well perfused with no edema.    Labs: See attached    EKG Interpretation   Ordered and interpreted by Sarah Ramírez MD  Rhythm: normal sinus   Rate: normal   Axis: normal   Ectopy: none   Conduction: normal   ST Segments: no acute change   T Waves: no acute change   Q Waves: none   Clinical Impression: no acute changes and normal EKG    IMPRESSION:  The encounter diagnosis was Preoperative examination.  1. Planned surgery: Liposuction and Seroma Removal   2. High risk medical conditions: negative for Cardiac, Pulmonary, Bleeding, Poor healing, Thrombosis, Debility    PLAN:  1. Chronic medical conditions: Stable and controlled. Continue current medicines.   2. Avoid drugs that potentiate bleeding as advised by surgeon  3. Discontinue all herbal supplements 2 weeks prior to surgery.  4. Need for SBE prophylaxis: no  5. This patient is considered Very Low risk for cardiopulmonary complications for this planned surgery by the Godfrey index    Godfrey Index for assessing perioperative " cardiovascular risk [Circulation 1999;100:1047]:   (one point each for * high-risk surgery [ intrathoracic, suprainguinal vascular, intraperitoneal ],* Hx ischemic heart dz, * Hx CHF, *Hx TIA or CVA, *IDDM, *Serum Cr >2.0)   Interpretation of risk: (Complication = MI, Pulm edema, v-fib or cardiac arrest, complete heart block)  Very Low: 0 points = 0.4 % complication. Low: 1 point = 0.9 %, Moderate: 2 points = 6.6 %, High: 3+ points = 11%.    I spent a total of 25 minutes with record review, exam, communication with the patient, and documentation of this encounter.

## 2022-03-02 LAB
BACTERIA UR CULT: NORMAL
SIGNIFICANT IND 70042: NORMAL
SITE SITE: NORMAL
SOURCE SOURCE: NORMAL

## 2022-05-05 ENCOUNTER — OFFICE VISIT (OUTPATIENT)
Dept: MEDICAL GROUP | Facility: PHYSICIAN GROUP | Age: 42
End: 2022-05-05
Payer: COMMERCIAL

## 2022-05-05 VITALS
DIASTOLIC BLOOD PRESSURE: 70 MMHG | TEMPERATURE: 98 F | HEART RATE: 78 BPM | RESPIRATION RATE: 16 BRPM | OXYGEN SATURATION: 99 % | SYSTOLIC BLOOD PRESSURE: 118 MMHG | WEIGHT: 163.6 LBS | BODY MASS INDEX: 32.12 KG/M2 | HEIGHT: 60 IN

## 2022-05-05 DIAGNOSIS — Z00.00 WELLNESS EXAMINATION: Primary | ICD-10-CM

## 2022-05-05 DIAGNOSIS — Z13.79 GENETIC SCREENING: ICD-10-CM

## 2022-05-05 DIAGNOSIS — Z11.3 SCREENING EXAMINATION FOR SEXUALLY TRANSMITTED DISEASE: ICD-10-CM

## 2022-05-05 DIAGNOSIS — N95.1 PERIMENOPAUSAL: ICD-10-CM

## 2022-05-05 PROBLEM — N91.2 AMENORRHEA: Status: RESOLVED | Noted: 2022-01-18 | Resolved: 2022-05-05

## 2022-05-05 PROCEDURE — 99396 PREV VISIT EST AGE 40-64: CPT | Performed by: FAMILY MEDICINE

## 2022-05-05 RX ORDER — ENOXAPARIN SODIUM 100 MG/ML
INJECTION SUBCUTANEOUS
COMMUNITY
Start: 2022-04-27 | End: 2023-05-08

## 2022-05-05 RX ORDER — CEPHALEXIN 500 MG/1
500 CAPSULE ORAL EVERY 8 HOURS
COMMUNITY
Start: 2022-04-27 | End: 2023-05-08

## 2022-05-05 RX ORDER — NORGESTIMATE AND ETHINYL ESTRADIOL 0.25-0.035
1 KIT ORAL DAILY
Qty: 84 TABLET | Refills: 3 | Status: SHIPPED | OUTPATIENT
Start: 2022-05-05 | End: 2023-05-08

## 2022-05-05 ASSESSMENT — PATIENT HEALTH QUESTIONNAIRE - PHQ9: CLINICAL INTERPRETATION OF PHQ2 SCORE: 0

## 2022-05-05 ASSESSMENT — FIBROSIS 4 INDEX: FIB4 SCORE: 0.91

## 2022-05-05 NOTE — PROGRESS NOTES
"Subjective:     CC:   Chief Complaint   Patient presents with   • Annual Exam       HPI:   Nav Coates is a 41 y.o. female who presents for annual exam. She is feeling well and denies any complaints.    Perimenopause  Onset: 2021  Menstrual history: menarche - 10, used to be very regular  Symptoms: +hot flashes, no sleep disturbances, no depression, no vaginal dryness  HRT:   ASCVD 0.6% - HRT safe  5-year breast cancer risk 0.8% - HRT safe    Ob-Gyn/ History:    Patient has GYN provider: no  /Para:  4/2  Last Pap Smear:  . No history of abnormal pap smears.  Gyn Surgery:  none.  Current Contraceptive Method:  None - PID. Yes currently sexually active.  Last menstrual period:  2022.  Periods irregular. light bleeding. Cramping is mild.   She does take OTC analgesics for cramps.  No significant bloating/fluid retention, pelvic pain or dyspareunia. No vaginal discharge  Post-menopausal bleeding: n/a  Urinary incontinence: n/a  Folate intake: yes     Health Maintenance  Advanced directive: n/a   Osteoporosis Screen/ DEXA: n/a   PT/vit D for falls prevention: n/a   Cholesterol Screenin2021 - T chol 239, , HDL 51, ; ASCVD 0.6%  Diabetes Screenin2022 - fasting glucose 92  Aspirin Use: n/a    Diet: healthy \"as much as I can\"   Exercise: at least 2 days/week   Substance Abuse: None   Safe in relationship.   Seat belts, bike helmet, gun safety discussed.  Sun protection used.    Cancer screening  Colorectal Cancer Screening: n/a    Lung Cancer Screening: n/a - never smoker    Cervical Cancer Screening: due   Breast Cancer Screening: due 2022    Infectious disease screening/Immunizations  --STI Screening: ordered  --Practices safe sex.  --HIV Screening: completed - neg ()  --Hepatitis C Screening: completed - neg ()  --Immunizations:    Influenza: completed    HPV:  n/a    Tetanus: due     Shingles: n/a    Pneumococcal: due    COVID-19: booster " recommended   Other immunizations: n/a    She  has no past medical history on file.  She  has a past surgical history that includes carpal tunnel release (Bilateral, 2019).    Family History   Problem Relation Age of Onset   • Lupus Mother    • Heart Disease Father    • No Known Problems Sister    • Breast Cancer Maternal Aunt    • Stroke Maternal Grandmother    • Breast Cancer Maternal Grandmother    • No Known Problems Sister    • No Known Problems Sister    • No Known Problems Sister    • Cancer Maternal Aunt         brain   • Cancer Maternal Uncle         liver   • Cancer Maternal Uncle    • Diabetes Neg Hx    • Alcohol abuse Neg Hx    • Drug abuse Neg Hx    • Colorectal Cancer Neg Hx    • Peritoneal Cancer Neg Hx    • Ovarian Cancer Neg Hx    • Tubal Cancer Neg Hx        Social History     Socioeconomic History   • Marital status: Single     Spouse name: Not on file   • Number of children: Not on file   • Years of education: Not on file   • Highest education level: Not on file   Occupational History   • Not on file   Tobacco Use   • Smoking status: Never Smoker   • Smokeless tobacco: Never Used   Vaping Use   • Vaping Use: Never used   Substance and Sexual Activity   • Alcohol use: No   • Drug use: No   • Sexual activity: Yes     Partners: Male     Birth control/protection: None     Comment:     Other Topics Concern   • Not on file   Social History Narrative   • Not on file     Social Determinants of Health     Financial Resource Strain: Not on file   Food Insecurity: Not on file   Transportation Needs: Not on file   Physical Activity: Not on file   Stress: Not on file   Social Connections: Not on file   Intimate Partner Violence: Not on file   Housing Stability: Not on file       Patient Active Problem List    Diagnosis Date Noted   • Bilateral carpal tunnel syndrome 04/10/2019   • Major depressive disorder, recurrent episode, in full remission (HCC) 02/03/2016   • Obesity (BMI 30-39.9) 02/03/2016   •  Chronic low back pain 12/15/2015   • Anxiety disorder 10/15/2015   • Attention deficit hyperactivity disorder (ADHD), predominantly inattentive type 10/26/2005   • Allergic rhinitis 11/03/2004   • Moderate persistent asthma 11/03/2004         Current Outpatient Medications   Medication Sig Dispense Refill   • cephALEXin (KEFLEX) 500 MG Cap Take 500 mg by mouth every 8 hours.     • enoxaparin (LOVENOX) 30 MG/0.3ML Solution Prefilled Syringe inj INJECT EVERY DAY     • norgestimate-ethinyl estradiol (ORTHO-CYCLEN) 0.25-35 MG-MCG per tablet Take 1 Tablet by mouth every day. 84 Tablet 3   • ADVAIR DISKUS 250-50 MCG/DOSE AEROSOL POWDER, BREATH ACTIVATED TAKE 1 PUFF BY MOUTH EVERY 12 HOURS 60 Each 3   • VENTOLIN  (90 Base) MCG/ACT Aero Soln inhalation aerosol INHALE 2 PUFFS BY MOUTH EVERY FOUR HOURS AS NEEDED FOR SHORTNESS OF BREATH. 2 Each 2   • montelukast (SINGULAIR) 10 MG Tab TAKE 1 TABLET BY MOUTH EVERY DAY 90 tablet 1   • albuterol (PROVENTIL) 2.5mg/3ml Nebu Soln solution for nebulization 3 mL by Nebulization route every four hours as needed for Shortness of Breath. 30 Bullet 2     No current facility-administered medications for this visit.     No Known Allergies    Review of Systems   Constitutional: Negative for fever, chills.   HENT: Negative for congestion.  Eyes: Negative for pain.    Respiratory: Negative for cough.  Cardiovascular: Negative for leg swelling.   Gastrointestinal: Negative for nausea, vomiting.   Genitourinary: Negative for dysuria  Skin: Negative for rash.   Neurological: Negative for dizziness.   Endo/Heme/Allergies: Does not bleed easily.   Psychiatric/Behavioral: Negative for depression.  The patient is not nervous/anxious.      Objective:     /70 (BP Location: Right arm, Patient Position: Sitting, BP Cuff Size: Adult)   Pulse 78   Temp 36.7 °C (98 °F) (Temporal)   Resp 16   Ht 1.524 m (5')   Wt 74.2 kg (163 lb 9.6 oz)   SpO2 99%   BMI 31.95 kg/m²   Body mass index is  31.95 kg/m².  Wt Readings from Last 4 Encounters:   05/05/22 74.2 kg (163 lb 9.6 oz)   03/01/22 74.6 kg (164 lb 6.4 oz)   01/18/22 76.8 kg (169 lb 6.4 oz)   11/12/21 75.3 kg (166 lb)       Physical Exam:  Constitutional: Well-developed and well-nourished. Not diaphoretic. No distress.   Skin: Skin is warm and dry. No rash noted.  Head: Atraumatic without lesions.  Eyes: Conjunctivae and extraocular motions are normal. Pupils are equal, round, and reactive to light. No scleral icterus.   Ears:  External ears unremarkable. Tympanic membranes clear and intact.  Mouth/Throat: Dentition is good. Tongue normal. Oropharynx is clear and moist. Posterior pharynx without erythema or exudates.  Neck: Supple, trachea midline. Normal range of motion. No thyromegaly present. No lymphadenopathy--cervical or supraclavicular.  Cardiovascular: Regular rate and rhythm, S1 and S2 without murmur, rubs, or gallops.  Lungs: Normal inspiratory effort, CTA bilaterally, no wheezes/rhonchi/rales  Abdomen: Post-operative girdle present. Unable to perform exam.   Extremities: No cyanosis, clubbing, erythema, nor edema. Distal pulses intact and symmetric.   Musculoskeletal: All major joints AROM full in all directions without pain.  Neurological: Alert and oriented x 3. DTRs 2+/3 and symmetric. No cranial nerve deficit. 5/5 myotomes. Sensation intact.   Psychiatric:  Behavior, mood, and affect are appropriate.    Assessment and Plan:     1. Wellness examination     2. Screening examination for sexually transmitted disease  Chlamydia/GC PCR (Urine)    T.PALLIDUM AB EIA   3. Perimenopausal  Irregular menses. Start OCP for regularity.   4. Genetic screening  Referral to Genetic Research Studies     HCM:  Up to date   Labs per orders  Immunizations per orders  Patient counseled about skin care, diet, supplements, prenatal vitamins, safe sex and exercise.      Follow-up: Return in about 6 months (around 11/5/2022) for Med check.

## 2022-06-24 ENCOUNTER — RESEARCH ENCOUNTER (OUTPATIENT)
Dept: MEDICAL GROUP | Facility: PHYSICIAN GROUP | Age: 42
End: 2022-06-24
Attending: FAMILY MEDICINE
Payer: COMMERCIAL

## 2022-06-24 DIAGNOSIS — Z00.6 RESEARCH STUDY PATIENT: ICD-10-CM

## 2022-07-13 ENCOUNTER — HOSPITAL ENCOUNTER (OUTPATIENT)
Dept: RADIOLOGY | Facility: MEDICAL CENTER | Age: 42
End: 2022-07-13
Payer: COMMERCIAL

## 2022-07-18 LAB
APOB+LDLR+PCSK9 GENE MUT ANL BLD/T: NOT DETECTED
BRCA1+BRCA2 DEL+DUP + FULL MUT ANL BLD/T: NOT DETECTED
MLH1+MSH2+MSH6+PMS2 GN DEL+DUP+FUL M: NOT DETECTED

## 2022-08-01 ENCOUNTER — HOSPITAL ENCOUNTER (OUTPATIENT)
Dept: RADIOLOGY | Facility: MEDICAL CENTER | Age: 42
End: 2022-08-01
Attending: FAMILY MEDICINE
Payer: COMMERCIAL

## 2022-08-01 DIAGNOSIS — N64.4 BREAST PAIN: ICD-10-CM

## 2022-08-01 DIAGNOSIS — Z98.82 HISTORY OF BREAST AUGMENTATION: ICD-10-CM

## 2022-08-01 PROCEDURE — 76641 ULTRASOUND BREAST COMPLETE: CPT

## 2022-08-09 ENCOUNTER — APPOINTMENT (OUTPATIENT)
Dept: RADIOLOGY | Facility: MEDICAL CENTER | Age: 42
End: 2022-08-09
Attending: FAMILY MEDICINE
Payer: COMMERCIAL

## 2022-08-12 RX ORDER — ALBUTEROL SULFATE 90 UG/1
AEROSOL, METERED RESPIRATORY (INHALATION)
Qty: 25.5 EACH | Refills: 0 | Status: SHIPPED | OUTPATIENT
Start: 2022-08-12 | End: 2024-03-01 | Stop reason: SDUPTHER

## 2022-08-23 ENCOUNTER — HOSPITAL ENCOUNTER (OUTPATIENT)
Dept: RADIOLOGY | Facility: MEDICAL CENTER | Age: 42
End: 2022-08-23
Attending: FAMILY MEDICINE
Payer: COMMERCIAL

## 2022-08-23 DIAGNOSIS — S30.1XXA ABDOMINAL WALL SEROMA, INITIAL ENCOUNTER: ICD-10-CM

## 2022-08-23 PROCEDURE — 76705 ECHO EXAM OF ABDOMEN: CPT

## 2023-05-08 ENCOUNTER — HOSPITAL ENCOUNTER (OUTPATIENT)
Dept: LAB | Facility: MEDICAL CENTER | Age: 43
End: 2023-05-08
Attending: FAMILY MEDICINE
Payer: COMMERCIAL

## 2023-05-08 ENCOUNTER — OFFICE VISIT (OUTPATIENT)
Dept: MEDICAL GROUP | Facility: PHYSICIAN GROUP | Age: 43
End: 2023-05-08
Payer: COMMERCIAL

## 2023-05-08 VITALS
BODY MASS INDEX: 32.86 KG/M2 | DIASTOLIC BLOOD PRESSURE: 62 MMHG | HEIGHT: 59 IN | WEIGHT: 163 LBS | HEART RATE: 74 BPM | OXYGEN SATURATION: 98 % | TEMPERATURE: 98.1 F | SYSTOLIC BLOOD PRESSURE: 114 MMHG

## 2023-05-08 DIAGNOSIS — Z00.00 WELLNESS EXAMINATION: ICD-10-CM

## 2023-05-08 DIAGNOSIS — Z00.00 WELLNESS EXAMINATION: Primary | ICD-10-CM

## 2023-05-08 DIAGNOSIS — E66.9 OBESITY (BMI 30-39.9): ICD-10-CM

## 2023-05-08 DIAGNOSIS — R19.06 EPIGASTRIC MASS: ICD-10-CM

## 2023-05-08 DIAGNOSIS — Z12.11 COLON CANCER SCREENING: ICD-10-CM

## 2023-05-08 LAB
ALBUMIN SERPL BCP-MCNC: 4.1 G/DL (ref 3.2–4.9)
ALBUMIN/GLOB SERPL: 1.2 G/DL
ALP SERPL-CCNC: 64 U/L (ref 30–99)
ALT SERPL-CCNC: 14 U/L (ref 2–50)
ANION GAP SERPL CALC-SCNC: 9 MMOL/L (ref 7–16)
AST SERPL-CCNC: 18 U/L (ref 12–45)
BILIRUB SERPL-MCNC: 0.3 MG/DL (ref 0.1–1.5)
BUN SERPL-MCNC: 10 MG/DL (ref 8–22)
CALCIUM ALBUM COR SERPL-MCNC: 9.4 MG/DL (ref 8.5–10.5)
CALCIUM SERPL-MCNC: 9.5 MG/DL (ref 8.5–10.5)
CHLORIDE SERPL-SCNC: 103 MMOL/L (ref 96–112)
CHOLEST SERPL-MCNC: 262 MG/DL (ref 100–199)
CO2 SERPL-SCNC: 26 MMOL/L (ref 20–33)
CREAT SERPL-MCNC: 0.83 MG/DL (ref 0.5–1.4)
FASTING STATUS PATIENT QL REPORTED: NORMAL
GFR SERPLBLD CREATININE-BSD FMLA CKD-EPI: 88 ML/MIN/1.73 M 2
GLOBULIN SER CALC-MCNC: 3.5 G/DL (ref 1.9–3.5)
GLUCOSE SERPL-MCNC: 99 MG/DL (ref 65–99)
HDLC SERPL-MCNC: 63 MG/DL
LDLC SERPL CALC-MCNC: 179 MG/DL
POTASSIUM SERPL-SCNC: 4.8 MMOL/L (ref 3.6–5.5)
PROT SERPL-MCNC: 7.6 G/DL (ref 6–8.2)
SODIUM SERPL-SCNC: 138 MMOL/L (ref 135–145)
TRIGL SERPL-MCNC: 102 MG/DL (ref 0–149)

## 2023-05-08 PROCEDURE — 80053 COMPREHEN METABOLIC PANEL: CPT

## 2023-05-08 PROCEDURE — 99213 OFFICE O/P EST LOW 20 MIN: CPT | Mod: 25 | Performed by: FAMILY MEDICINE

## 2023-05-08 PROCEDURE — 80061 LIPID PANEL: CPT

## 2023-05-08 PROCEDURE — 99396 PREV VISIT EST AGE 40-64: CPT | Performed by: FAMILY MEDICINE

## 2023-05-08 PROCEDURE — 36415 COLL VENOUS BLD VENIPUNCTURE: CPT

## 2023-05-08 ASSESSMENT — PATIENT HEALTH QUESTIONNAIRE - PHQ9
2. FEELING DOWN, DEPRESSED, IRRITABLE, OR HOPELESS: NOT AT ALL
SUM OF ALL RESPONSES TO PHQ QUESTIONS 1-9: 0
9. THOUGHTS THAT YOU WOULD BE BETTER OFF DEAD, OR OF HURTING YOURSELF: NOT AT ALL
8. MOVING OR SPEAKING SO SLOWLY THAT OTHER PEOPLE COULD HAVE NOTICED. OR THE OPPOSITE, BEING SO FIGETY OR RESTLESS THAT YOU HAVE BEEN MOVING AROUND A LOT MORE THAN USUAL: NOT AT ALL
1. LITTLE INTEREST OR PLEASURE IN DOING THINGS: NOT AT ALL
3. TROUBLE FALLING OR STAYING ASLEEP OR SLEEPING TOO MUCH: NOT AT ALL
5. POOR APPETITE OR OVEREATING: NOT AT ALL
4. FEELING TIRED OR HAVING LITTLE ENERGY: NOT AT ALL
6. FEELING BAD ABOUT YOURSELF - OR THAT YOU ARE A FAILURE OR HAVE LET YOURSELF OR YOUR FAMILY DOWN: NOT AL ALL
SUM OF ALL RESPONSES TO PHQ9 QUESTIONS 1 AND 2: 0
7. TROUBLE CONCENTRATING ON THINGS, SUCH AS READING THE NEWSPAPER OR WATCHING TELEVISION: NOT AT ALL

## 2023-05-08 ASSESSMENT — FIBROSIS 4 INDEX: FIB4 SCORE: .927884361197612836

## 2023-05-08 NOTE — LETTER
[facility]  Sarah Ramírez M.D.  [address]  [place] [address]  Fax: [phone]   Authorization for Release/Disclosure of   Protected Health Information   Name: PREMA MORGAN : 1980 SSN: [id number]   Address: [address] [place] [address]  [address]  [facility] [address] Phone:    [phone] (home)    I authorize the entity listed below to release/disclose the PHI below to:   [facility]/Sarah Ramírez M.D. and Sarah Ramírez M.D.   Provider or Entity Name:  [place], [place]   Address   City, State, Zip   Phone:      Fax:     Reason for request: continuity of care   Information to be released:    [  ] LAST COLONOSCOPY,  including any PATH REPORT and follow-up  [  ] LAST FIT/COLOGUARD RESULT [  ] LAST DEXA  [  ] LAST MAMMOGRAM  [  ] LAST PAP  [  ] LAST LABS [  ] RETINA EXAM REPORT  [XX] IMMUNIZATION RECORDS  [  ] Release all info      [  ] Check here and initial the line next to each item to release ALL health information INCLUDING  _____ Care and treatment for drug and / or alcohol abuse  _____ HIV testing, infection status, or AIDS  _____ Genetic Testing    DATES OF SERVICE OR TIME PERIOD TO BE DISCLOSED: _____________  I understand and acknowledge that:  * This Authorization may be revoked at any time by you in writing, except if your health information has already been used or disclosed.  * Your health information that will be used or disclosed as a result of you signing this authorization could be re-disclosed by the recipient. If this occurs, your re-disclosed health information may no longer be protected by State or Federal laws.  * You may refuse to sign this Authorization. Your refusal will not affect your ability to obtain treatment.  * This Authorization becomes effective upon signing and will  on (date) __________.      If no date is indicated, this Authorization will  one (1) year from the signature date.    Name: Prema Morgan  Signature: Date:    5/8/2023     PLEASE FAX REQUESTED RECORDS BACK TO: [phone]

## 2023-05-08 NOTE — PROGRESS NOTES
Subjective:     CC:   Chief Complaint   Patient presents with    Annual Exam       HPI:   Nav Coates is a 41 y.o. female who presents for annual exam. She is feeling well and denies any complaints.    Ob-Gyn/ History:    Patient has GYN provider: no  /Para:  4/2  Last Pap Smear:  . No history of abnormal pap smears.  Gyn Surgery:  none.  Current Contraceptive Method:  None - PID. Yes currently sexually active.  Last menstrual period:  2023.  Periods regular. Medium bleeding. Cramping is mild.   She does take OTC analgesics for cramps.  No significant bloating/fluid retention, +pelvic pain - burning sometimes. +dyspareunia - sometimes. No vaginal discharge  Post-menopausal bleeding: n/a  Urinary incontinence: n/a  Folate intake: no    Health Maintenance  Advanced directive: n/a   Osteoporosis Screen/ DEXA: n/a   PT/vit D for falls prevention: n/a   Cholesterol Screenin2021 - T chol 239, , HDL 51, ; ASCVD 1%  Diabetes Screenin2022 - A1c 5.6%  Aspirin Use: n/a      Anticipatory Guidance  Diet: trying to eat healthy  Exercise: 2x/week  Substance Abuse: None   Safe in relationship.   Seat belts, bike helmet, gun safety discussed.  Sun protection used.  Dental home.    Cancer screening  Colorectal Cancer Screening: ordered  Lung Cancer Screening: n/a - never smoker    Cervical Cancer Screening: due   Breast Cancer Screening: scheduled    Infectious disease screening/Immunizations  --STI Screening: ordered  --Practices safe sex.  --HIV Screening: completed - neg ()  --Hepatitis C Screening: completed - neg ()  --Immunizations:    Influenza: completed    HPV:  n/a    Tetanus: due     Shingles: n/a    Pneumococcal: due    Hepatitis B: requesting records   COVID-19: bivalent booster recommended   Other immunizations: n/a    She  has no past medical history on file.  She  has a past surgical history that includes carpal tunnel release (Bilateral, 2019).    Family  History   Problem Relation Age of Onset    Lupus Mother     Heart Disease Father     No Known Problems Sister     Breast Cancer Maternal Aunt     Stroke Maternal Grandmother     Breast Cancer Maternal Grandmother     No Known Problems Sister     No Known Problems Sister     No Known Problems Sister     Cancer Maternal Aunt         brain    Cancer Maternal Uncle         liver    Cancer Maternal Uncle     Diabetes Neg Hx     Alcohol abuse Neg Hx     Drug abuse Neg Hx     Colorectal Cancer Neg Hx     Peritoneal Cancer Neg Hx     Ovarian Cancer Neg Hx     Tubal Cancer Neg Hx        Social History     Socioeconomic History    Marital status: Single     Spouse name: Not on file    Number of children: Not on file    Years of education: Not on file    Highest education level: Not on file   Occupational History    Not on file   Tobacco Use    Smoking status: Never    Smokeless tobacco: Never   Vaping Use    Vaping Use: Never used   Substance and Sexual Activity    Alcohol use: No    Drug use: No    Sexual activity: Yes     Partners: Male     Birth control/protection: None     Comment:     Other Topics Concern    Not on file   Social History Narrative    Not on file     Social Determinants of Health     Financial Resource Strain: Not on file   Food Insecurity: Not on file   Transportation Needs: Not on file   Physical Activity: Not on file   Stress: Not on file   Social Connections: Not on file   Intimate Partner Violence: Not on file   Housing Stability: Not on file       Patient Active Problem List    Diagnosis Date Noted    Bilateral carpal tunnel syndrome 04/10/2019    Major depressive disorder, recurrent episode, in full remission (HCC) 02/03/2016    Obesity (BMI 30-39.9) 02/03/2016    Chronic low back pain 12/15/2015    Anxiety disorder 10/15/2015    Attention deficit hyperactivity disorder (ADHD), predominantly inattentive type 10/26/2005    Allergic rhinitis 11/03/2004    Moderate persistent asthma 11/03/2004  "        Current Outpatient Medications   Medication Sig Dispense Refill    albuterol 108 (90 Base) MCG/ACT Aero Soln inhalation aerosol INHALE 2 PUFFS BY MOUTH EVERY FOUR HOURS AS NEEDED FOR SHORTNESS OF BREATH. 25.5 Each 0    ADVAIR DISKUS 250-50 MCG/DOSE AEROSOL POWDER, BREATH ACTIVATED TAKE 1 PUFF BY MOUTH EVERY 12 HOURS 60 Each 3    albuterol (PROVENTIL) 2.5mg/3ml Nebu Soln solution for nebulization 3 mL by Nebulization route every four hours as needed for Shortness of Breath. 30 Bullet 2     No current facility-administered medications for this visit.     No Known Allergies    Review of Systems   Constitutional: Negative for fever, chills.   HENT: Negative for congestion.  Eyes: Negative for pain.    Respiratory: Negative for cough.  Cardiovascular: Negative for leg swelling.   Gastrointestinal: Negative for nausea, vomiting.   Genitourinary: Negative for dysuria  Skin: Negative for rash.   Neurological: Negative for dizziness.   Endo/Heme/Allergies: Does not bleed easily.   Psychiatric/Behavioral: Negative for depression.  The patient is not nervous/anxious.      Objective:     /62 (BP Location: Right arm, Patient Position: Sitting, BP Cuff Size: Adult)   Pulse 74   Temp 36.7 °C (98.1 °F) (Temporal)   Ht 1.499 m (4' 11\")   Wt 73.9 kg (163 lb)   SpO2 98%   BMI 32.92 kg/m²   Body mass index is 32.92 kg/m².  Wt Readings from Last 4 Encounters:   05/08/23 73.9 kg (163 lb)   05/05/22 74.2 kg (163 lb 9.6 oz)   03/01/22 74.6 kg (164 lb 6.4 oz)   01/18/22 76.8 kg (169 lb 6.4 oz)     Physical Exam:  Constitutional: Well-developed and well-nourished. Not diaphoretic. No distress.   Skin: Skin is warm and dry. No rash noted.  Head: Atraumatic without lesions.  Eyes: Conjunctivae and extraocular motions are normal. Pupils are equal, round, and reactive to light. No scleral icterus.   Ears:  External ears unremarkable. Tympanic membranes clear and intact.  Mouth/Throat: Dentition is good. Tongue normal. " Oropharynx is clear and moist. Posterior pharynx without erythema or exudates.  Neck: Supple, trachea midline. Normal range of motion. No thyromegaly present. No lymphadenopathy--cervical or supraclavicular.  Cardiovascular: Regular rate and rhythm, S1 and S2 without murmur, rubs, or gallops.  Lungs: Normal inspiratory effort, CTA bilaterally, no wheezes/rhonchi/rales  Abdomen: Soft, non tender, and without distention. Active bowel sounds in all four quadrants. No rebound, guarding, masses or HSM.  Extremities: No cyanosis, clubbing, erythema, nor edema. Distal pulses intact and symmetric.   Musculoskeletal: All major joints AROM full in all directions without pain.  Neurological: Alert and oriented x 3. DTRs 2+/3 and symmetric. No cranial nerve deficit. 5/5 myotomes. Sensation intact.   Psychiatric:  Behavior, mood, and affect are appropriate.    Assessment and Plan:     1. Wellness examination  Comp Metabolic Panel    Lipid Profile      2. Obesity (BMI 30-39.9)  Patient identified as having weight management issue.  Appropriate orders and counseling given.      3. Colon cancer screening  OCCULT BLOOD FECES IMMUNOASSAY (FIT)      4. Epigastric mass  US-ABDOMEN COMPLETE SURVEY        HCM:  Up to date   Labs per orders  Immunizations per orders  Patient counseled about skin care, diet, supplements, prenatal vitamins, safe sex and exercise.    ACUTE VISIT    4. Epigastric mass  Chronic, uncontrolled.  In July, 2021 she had a tummy tuck with a breast lift and bilateral breast implants.  She then developed a seroma of the abdomen and in April, 2023 she had liposuction with seroma removal.  She reports since then she feels like the seroma never went away.  She still feels a discrete mass in the upper abdomen and sometimes a bloated sensation.  Ultrasound in August, 2022 did not show any evidence of a recurrent seroma.  She would like to recheck an ultrasound to see if there is one now.  An ultrasound has been ordered to  evaluate for recurrent seroma.  If there is no seroma then we will have her return to discuss these GI symptoms in much more detail.  - US-ABDOMEN COMPLETE SURVEY; Future    **Patient was informed the annual wellness visit does not include a discussion of acute/new concerns. She was informed that this portion of the visit will be coded separately and She will receive a separate bill later. Patient expressed understanding.      Follow-up: Return in about 1 year (around 5/8/2024) for Annual/wellness visit.

## 2023-05-09 ENCOUNTER — HOSPITAL ENCOUNTER (OUTPATIENT)
Facility: MEDICAL CENTER | Age: 43
End: 2023-05-09
Attending: FAMILY MEDICINE
Payer: COMMERCIAL

## 2023-05-09 ENCOUNTER — HOSPITAL ENCOUNTER (OUTPATIENT)
Dept: RADIOLOGY | Facility: MEDICAL CENTER | Age: 43
End: 2023-05-09
Attending: FAMILY MEDICINE
Payer: COMMERCIAL

## 2023-05-09 DIAGNOSIS — R19.06 EPIGASTRIC MASS: ICD-10-CM

## 2023-05-09 PROCEDURE — 76705 ECHO EXAM OF ABDOMEN: CPT

## 2023-05-09 PROCEDURE — 82274 ASSAY TEST FOR BLOOD FECAL: CPT

## 2023-05-15 DIAGNOSIS — Z12.11 COLON CANCER SCREENING: ICD-10-CM

## 2023-05-17 LAB — IMM ASSAY OCC BLD FITOB: NEGATIVE

## 2023-08-22 ENCOUNTER — HOSPITAL ENCOUNTER (OUTPATIENT)
Facility: MEDICAL CENTER | Age: 43
End: 2023-08-22
Attending: STUDENT IN AN ORGANIZED HEALTH CARE EDUCATION/TRAINING PROGRAM
Payer: COMMERCIAL

## 2023-08-22 ENCOUNTER — OFFICE VISIT (OUTPATIENT)
Dept: MEDICAL GROUP | Facility: PHYSICIAN GROUP | Age: 43
End: 2023-08-22
Payer: COMMERCIAL

## 2023-08-22 VITALS
DIASTOLIC BLOOD PRESSURE: 60 MMHG | HEART RATE: 68 BPM | HEIGHT: 59 IN | TEMPERATURE: 97.9 F | BODY MASS INDEX: 32.98 KG/M2 | SYSTOLIC BLOOD PRESSURE: 116 MMHG | OXYGEN SATURATION: 96 % | WEIGHT: 163.6 LBS

## 2023-08-22 DIAGNOSIS — B37.31 VAGINAL YEAST INFECTION: ICD-10-CM

## 2023-08-22 DIAGNOSIS — N89.8 VAGINAL DISCHARGE: ICD-10-CM

## 2023-08-22 LAB
POCT INT CON NEG: NEGATIVE
POCT INT CON POS: POSITIVE
POCT URINE PREGNANCY TEST: NEGATIVE

## 2023-08-22 PROCEDURE — 87510 GARDNER VAG DNA DIR PROBE: CPT

## 2023-08-22 PROCEDURE — 87660 TRICHOMONAS VAGIN DIR PROBE: CPT

## 2023-08-22 PROCEDURE — 3078F DIAST BP <80 MM HG: CPT | Performed by: STUDENT IN AN ORGANIZED HEALTH CARE EDUCATION/TRAINING PROGRAM

## 2023-08-22 PROCEDURE — 87480 CANDIDA DNA DIR PROBE: CPT

## 2023-08-22 PROCEDURE — 99214 OFFICE O/P EST MOD 30 MIN: CPT | Performed by: STUDENT IN AN ORGANIZED HEALTH CARE EDUCATION/TRAINING PROGRAM

## 2023-08-22 PROCEDURE — 3074F SYST BP LT 130 MM HG: CPT | Performed by: STUDENT IN AN ORGANIZED HEALTH CARE EDUCATION/TRAINING PROGRAM

## 2023-08-22 PROCEDURE — 81025 URINE PREGNANCY TEST: CPT | Performed by: STUDENT IN AN ORGANIZED HEALTH CARE EDUCATION/TRAINING PROGRAM

## 2023-08-22 RX ORDER — FLUCONAZOLE 150 MG/1
150 TABLET ORAL ONCE
Qty: 2 TABLET | Refills: 0 | Status: SHIPPED | OUTPATIENT
Start: 2023-08-22 | End: 2023-08-22

## 2023-08-22 ASSESSMENT — ENCOUNTER SYMPTOMS
CHILLS: 0
ABDOMINAL PAIN: 0
FEVER: 0
FLANK PAIN: 0
VOMITING: 0
NAUSEA: 0

## 2023-08-22 ASSESSMENT — FIBROSIS 4 INDEX: FIB4 SCORE: 0.77

## 2023-08-22 NOTE — PROGRESS NOTES
"Subjective:     PCP: Dr. Ramírez    CC:   Chief Complaint   Patient presents with    Vaginitis     After last period, about a week ago.   PT reports getting tested in July for all STDs all negative.     Itching     With milky color and thick       HPI:   Nav presents today with concerns about possible vaginal yeast infection.  Patient reports vaginal itching and irritation, as well as some thick whitish discharge.  She used Monistat a few days ago and her itching resolved, but she continues to have some discharge.  Symptoms onset about a week ago.  Denies any rashes.  She has not had any pain with urination, urinary frequency, or other urinary symptoms.  She denies any abdominal or pelvic pain.  LMP 8/12/2023, periods are regular monthly.  Patient was sexually active last about a month ago, does not use condoms.  She is not concerned about STDs at this time.  She was tested in July and states everything was normal.  She has had yeast infections in the past and states that her current symptoms are consistent with previous yeast infections.    Past medical, family, and social history reviewed by me in Epic chart today.   Medications and allergies reviewed in Epic chart by me today.       ROS:  Review of Systems   Constitutional:  Negative for chills and fever.   Gastrointestinal:  Negative for abdominal pain, nausea and vomiting.   Genitourinary:  Negative for dysuria, flank pain, frequency, hematuria and urgency.        + vaginal discharge   Skin:  Negative for rash.       Objective:     Exam:  /60 (BP Location: Right arm, Patient Position: Sitting, BP Cuff Size: Adult)   Pulse 68   Temp 36.6 °C (97.9 °F) (Temporal)   Ht 1.499 m (4' 11\")   Wt 74.2 kg (163 lb 9.6 oz)   LMP 08/12/2023 (Approximate)   SpO2 96%   BMI 33.04 kg/m²  Body mass index is 33.04 kg/m².    Physical Exam  Constitutional:       General: She is not in acute distress.  Pulmonary:      Effort: Pulmonary effort is normal.   Abdominal:    "   General: There is no distension.      Palpations: Abdomen is soft.      Tenderness: There is no abdominal tenderness. There is no right CVA tenderness or left CVA tenderness.   Musculoskeletal:      Cervical back: Normal range of motion.   Neurological:      Mental Status: She is alert and oriented to person, place, and time.   Psychiatric:         Mood and Affect: Mood normal.           Assessment & Plan:     43 y.o. female with the following -     1. Vaginal discharge  2. Vaginal yeast infection  Acute problem.  Symptoms are consistent with candidiasis, and patient states that her symptoms are similar to previous yeast infections.  We will treat empirically for vaginal candidiasis with oral fluconazole.  POC urine pregnancy negative. Patient self-swabbed for vaginal pathogens and chlamydia/gonorrhea, she will be contacted with results when they return and additional treatment can be initiated if needed. Return/ER precautions were reviewed.  - POCT PREGNANCY  - VAGINAL PATHOGENS DNA PANEL; Future  - Chlamydia/GC, PCR (Genital/Anal swab); Future  - fluconazole (DIFLUCAN) 150 MG tablet; Take 1 Tablet by mouth one time for 1 dose. If symptoms persist, repeat dose after 72 hours.  Dispense: 2 Tablet; Refill: 0      Return if symptoms worsen or fail to improve.    Please note that this dictation was created using voice recognition software. I have made every reasonable attempt to correct obvious errors, but I expect that there are errors of grammar and possibly content that I did not discover before finalizing the note.

## 2023-08-23 DIAGNOSIS — B96.89 BACTERIAL VAGINOSIS: ICD-10-CM

## 2023-08-23 DIAGNOSIS — N76.0 BACTERIAL VAGINOSIS: ICD-10-CM

## 2023-08-23 DIAGNOSIS — N89.8 VAGINAL DISCHARGE: ICD-10-CM

## 2023-08-23 LAB
CANDIDA DNA VAG QL PROBE+SIG AMP: NEGATIVE
G VAGINALIS DNA VAG QL PROBE+SIG AMP: POSITIVE
T VAGINALIS DNA VAG QL PROBE+SIG AMP: NEGATIVE

## 2023-08-23 RX ORDER — METRONIDAZOLE 7.5 MG/G
1 GEL VAGINAL
Qty: 70 G | Refills: 0 | Status: SHIPPED | OUTPATIENT
Start: 2023-08-23

## 2023-08-24 ENCOUNTER — TELEPHONE (OUTPATIENT)
Dept: MEDICAL GROUP | Facility: PHYSICIAN GROUP | Age: 43
End: 2023-08-24
Payer: COMMERCIAL

## 2023-09-08 ENCOUNTER — HOSPITAL ENCOUNTER (OUTPATIENT)
Dept: LAB | Facility: MEDICAL CENTER | Age: 43
End: 2023-09-08
Attending: FAMILY MEDICINE
Payer: COMMERCIAL

## 2023-09-08 DIAGNOSIS — Z11.3 SCREENING EXAMINATION FOR SEXUALLY TRANSMITTED DISEASE: ICD-10-CM

## 2023-09-08 LAB — T PALLIDUM AB SER QL IA: NORMAL

## 2023-09-08 PROCEDURE — 36415 COLL VENOUS BLD VENIPUNCTURE: CPT

## 2023-09-08 PROCEDURE — 86780 TREPONEMA PALLIDUM: CPT

## 2023-09-08 PROCEDURE — 87591 N.GONORRHOEAE DNA AMP PROB: CPT

## 2023-09-08 PROCEDURE — 87491 CHLMYD TRACH DNA AMP PROBE: CPT

## 2023-09-09 LAB
C TRACH DNA SPEC QL NAA+PROBE: NEGATIVE
N GONORRHOEA DNA SPEC QL NAA+PROBE: NEGATIVE
SPECIMEN SOURCE: NORMAL

## 2023-09-20 ENCOUNTER — HOSPITAL ENCOUNTER (OUTPATIENT)
Dept: RADIOLOGY | Facility: MEDICAL CENTER | Age: 43
End: 2023-09-20
Attending: FAMILY MEDICINE
Payer: COMMERCIAL

## 2023-09-20 DIAGNOSIS — N64.4 BREAST PAIN: ICD-10-CM

## 2023-09-20 DIAGNOSIS — Z98.82 HISTORY OF BREAST AUGMENTATION: ICD-10-CM

## 2023-09-20 PROCEDURE — 77063 BREAST TOMOSYNTHESIS BI: CPT

## 2024-01-09 DIAGNOSIS — Z11.3 SCREENING EXAMINATION FOR SEXUALLY TRANSMITTED DISEASE: ICD-10-CM

## 2024-01-09 NOTE — PROGRESS NOTES
Exposed 7/2023, tested negative but given PCN on 7/6/2023 just in case. Monitor at 6 & 12 months.     6 month check ordered  12 month check due 7/2024

## 2024-03-01 RX ORDER — ALBUTEROL SULFATE 90 UG/1
2 AEROSOL, METERED RESPIRATORY (INHALATION) EVERY 4 HOURS PRN
Qty: 25.5 EACH | Refills: 0 | Status: SHIPPED | OUTPATIENT
Start: 2024-03-01

## 2024-05-06 ENCOUNTER — APPOINTMENT (OUTPATIENT)
Dept: MEDICAL GROUP | Facility: PHYSICIAN GROUP | Age: 44
End: 2024-05-06
Payer: COMMERCIAL

## 2024-07-10 ENCOUNTER — PATIENT MESSAGE (OUTPATIENT)
Dept: MEDICAL GROUP | Facility: PHYSICIAN GROUP | Age: 44
End: 2024-07-10
Payer: COMMERCIAL

## 2024-07-10 DIAGNOSIS — Z20.2 EXPOSURE TO SYPHILIS: ICD-10-CM

## 2024-08-12 ENCOUNTER — OFFICE VISIT (OUTPATIENT)
Dept: URGENT CARE | Facility: PHYSICIAN GROUP | Age: 44
End: 2024-08-12
Payer: COMMERCIAL

## 2024-08-12 VITALS
DIASTOLIC BLOOD PRESSURE: 70 MMHG | BODY MASS INDEX: 32.44 KG/M2 | OXYGEN SATURATION: 96 % | TEMPERATURE: 97.6 F | RESPIRATION RATE: 16 BRPM | WEIGHT: 160.94 LBS | HEART RATE: 74 BPM | SYSTOLIC BLOOD PRESSURE: 118 MMHG | HEIGHT: 59 IN

## 2024-08-12 DIAGNOSIS — B02.9 HERPES ZOSTER WITHOUT COMPLICATION: ICD-10-CM

## 2024-08-12 PROCEDURE — 3078F DIAST BP <80 MM HG: CPT

## 2024-08-12 PROCEDURE — 3074F SYST BP LT 130 MM HG: CPT

## 2024-08-12 PROCEDURE — 99213 OFFICE O/P EST LOW 20 MIN: CPT

## 2024-08-12 ASSESSMENT — ENCOUNTER SYMPTOMS: FEVER: 0

## 2024-08-12 NOTE — PROGRESS NOTES
Verbal consent was acquired by the patient to use [employer] ambient listening note generation during this visit   Subjective:   Nav Coates is a 44 y.o. female who presents for Rash (X 4-5 days on abdomen )      HPI:  History of Present Illness  The patient is a 44-year-old female who presents today for rash.    She reports a rash on her upper abdomen and back, accompanied by a burning sensation and pain. This rash first appeared 45 days ago, with no prior history of such symptoms. She has not taken any medication for this condition. She had chickenpox as a child. She rates her pain as 10 out of 10. She initially attributed the rash to the elastic from her sports bra, but later noticed a rash after changing laundry detergent. The rash is causing her discomfort at night.        Review of Systems   Constitutional:  Negative for fever.   Skin:  Positive for rash. Negative for itching.       Medications:    Current Outpatient Medications on File Prior to Visit   Medication Sig Dispense Refill    albuterol 108 (90 Base) MCG/ACT Aero Soln inhalation aerosol Inhale 2 Puffs every four hours as needed for Shortness of Breath. (Patient not taking: Reported on 8/12/2024) 25.5 Each 0    metroNIDAZOLE (METROGEL-VAGINAL) 0.75 % Gel Insert 1 Applicator into the vagina at bedtime. For 5 nights (Patient not taking: Reported on 8/12/2024) 70 g 0    ADVAIR DISKUS 250-50 MCG/DOSE AEROSOL POWDER, BREATH ACTIVATED TAKE 1 PUFF BY MOUTH EVERY 12 HOURS (Patient not taking: Reported on 8/12/2024) 60 Each 3    albuterol (PROVENTIL) 2.5mg/3ml Nebu Soln solution for nebulization 3 mL by Nebulization route every four hours as needed for Shortness of Breath. (Patient not taking: Reported on 8/12/2024) 30 Bullet 2     No current facility-administered medications on file prior to visit.        Allergies:   Patient has no known allergies.    Problem List:   Patient Active Problem List   Diagnosis    Allergic rhinitis    Anxiety disorder     "Attention deficit hyperactivity disorder (ADHD), predominantly inattentive type    Bilateral carpal tunnel syndrome    Chronic low back pain    Major depressive disorder, recurrent episode, in full remission (HCC)    Moderate persistent asthma    Obesity (BMI 30-39.9)    Vaginal discharge        Surgical History:  Past Surgical History:   Procedure Laterality Date    CARPAL TUNNEL RELEASE Bilateral 2019 September, October       Past Social Hx:   Social History     Tobacco Use    Smoking status: Never    Smokeless tobacco: Never   Vaping Use    Vaping status: Never Used   Substance Use Topics    Alcohol use: No    Drug use: No          Problem list, medications, and allergies reviewed by myself today in Epic.     Objective:     /70 (BP Location: Right arm, Patient Position: Sitting, BP Cuff Size: Adult)   Pulse 74   Temp 36.4 °C (97.6 °F) (Temporal)   Resp 16   Ht 1.499 m (4' 11\")   Wt 73 kg (160 lb 15 oz)   SpO2 96%   BMI 32.51 kg/m²     Physical Exam  Vitals and nursing note reviewed.   Constitutional:       General: She is not in acute distress.     Appearance: Normal appearance. She is normal weight. She is not ill-appearing, toxic-appearing or diaphoretic.   HENT:      Head: Normocephalic and atraumatic.   Cardiovascular:      Rate and Rhythm: Normal rate and regular rhythm.      Pulses: Normal pulses.      Heart sounds: Normal heart sounds. No murmur heard.     No friction rub. No gallop.   Pulmonary:      Effort: Pulmonary effort is normal. No respiratory distress.      Breath sounds: Normal breath sounds. No stridor. No wheezing, rhonchi or rales.   Chest:      Chest wall: No tenderness.   Skin:     General: Skin is warm and dry.      Capillary Refill: Capillary refill takes less than 2 seconds.      Findings: Rash present. Rash is vesicular.             Comments: Vesicular rash with scab formation   Neurological:      General: No focal deficit present.      Mental Status: She is alert and " oriented to person, place, and time. Mental status is at baseline.      Gait: Gait normal.   Psychiatric:         Mood and Affect: Mood normal.         Behavior: Behavior normal.         Thought Content: Thought content normal.         Judgment: Judgment normal.         Assessment/Plan:     Diagnosis and associated orders:   1. Herpes zoster without complication               Comments/MDM:   Pt is clinically stable at today's acute urgent care visit.  No acute distress noted. Appropriate for outpatient management at this time.     Assessment & Plan  1. Shingles.  The rash is consistent with shingles.  She was advised to take Tylenol and ibuprofen consistently for pain management, and apply ice compresses to the affected area.  She is outside of the 72-hour timeframe for efficient antiviral.  Recommendations were made for hand hygiene, avoidance of contact with the affected area, and to keep the area covered.          Discussed DDx, management options (risks,benefits, and alternatives to planned treatment), natural progression and supportive care.  Expressed understanding and the treatment plan was agreed upon. Questions were encouraged and answered   Return to urgent care prn if new or worsening sx or if there is no improvement in condition prn.    Educated in Red flags and indications to immediately call 911 or present to the Emergency Department.   Advised the patient to follow-up with the primary care physician for recheck, reevaluation, and consideration of further management.    I personally reviewed prior external notes and test results pertinent to today's visit.  I have independently reviewed and interpreted all diagnostics ordered during this urgent care acute visit.         Please note that this dictation was created using voice recognition software. I have made a reasonable attempt to correct obvious errors, but I expect that there are errors of grammar and possibly content that I did not discover before  finalizing the note.    This note was electronically signed by MIGUEL Lewis

## 2024-11-13 ENCOUNTER — HOSPITAL ENCOUNTER (OUTPATIENT)
Dept: RADIOLOGY | Facility: MEDICAL CENTER | Age: 44
End: 2024-11-13
Attending: FAMILY MEDICINE
Payer: COMMERCIAL

## 2024-11-13 ENCOUNTER — APPOINTMENT (OUTPATIENT)
Dept: OBGYN | Facility: CLINIC | Age: 44
End: 2024-11-13
Payer: COMMERCIAL

## 2024-11-13 VITALS
SYSTOLIC BLOOD PRESSURE: 130 MMHG | HEIGHT: 59 IN | WEIGHT: 167 LBS | DIASTOLIC BLOOD PRESSURE: 91 MMHG | BODY MASS INDEX: 33.67 KG/M2

## 2024-11-13 DIAGNOSIS — Z13.1 SCREENING FOR DIABETES MELLITUS: ICD-10-CM

## 2024-11-13 DIAGNOSIS — Z12.11 ENCOUNTER FOR COLORECTAL CANCER SCREENING: ICD-10-CM

## 2024-11-13 DIAGNOSIS — Z11.3 SCREEN FOR STD (SEXUALLY TRANSMITTED DISEASE): ICD-10-CM

## 2024-11-13 DIAGNOSIS — R10.2 PELVIC PAIN: ICD-10-CM

## 2024-11-13 DIAGNOSIS — Z12.12 ENCOUNTER FOR COLORECTAL CANCER SCREENING: ICD-10-CM

## 2024-11-13 DIAGNOSIS — Z12.31 VISIT FOR SCREENING MAMMOGRAM: ICD-10-CM

## 2024-11-13 PROCEDURE — 3075F SYST BP GE 130 - 139MM HG: CPT | Performed by: STUDENT IN AN ORGANIZED HEALTH CARE EDUCATION/TRAINING PROGRAM

## 2024-11-13 PROCEDURE — 99386 PREV VISIT NEW AGE 40-64: CPT | Performed by: STUDENT IN AN ORGANIZED HEALTH CARE EDUCATION/TRAINING PROGRAM

## 2024-11-13 PROCEDURE — 3080F DIAST BP >= 90 MM HG: CPT | Performed by: STUDENT IN AN ORGANIZED HEALTH CARE EDUCATION/TRAINING PROGRAM

## 2024-11-13 PROCEDURE — 77067 SCR MAMMO BI INCL CAD: CPT

## 2024-11-13 NOTE — PROGRESS NOTES
Patient here for annual exam  Last pap done/result:04/30/2021   LMP: 11/12/2024  BCM:Tubal   Last mammogram, if applicable:  Phone number: [phone]   Pharmacy verified  Painful ovulation

## 2024-11-13 NOTE — PROGRESS NOTES
ANNUAL GYNECOLOGY VISIT  [facility]    Chief Complaint  Annual Exam  New Patient      Subjective   Nav Coates is a 44 y.o. female  Patient's last menstrual period was 2024 (exact date). Who presents for her annual exam.     Her main concern today is ovulatory pain since 2006 after her removal of her right fallopian tube for possible TOA vs ectopic? Her pain is crampy right sided lower abdominal pain that is pinpoint. It worsens two weeks after her menses. It improves with aleve and tylenol. The pain has been acutely worsening in the past 3-6 months. She reports she underwent a HSG previously, with blockage of her remaining tube. No currently on contraception. Denies fevers, chills, vaginal discharge, nausea, vomiting.     Preventive Care   Immunization History   Administered Date(s) Administered    Hepatitis B Vaccine Adolescent/Pediatric 2001    Influenza Seasonal Injectable - Historical Data 10/13/2015, 2018    Influenza Vaccine Quad Inj (Pf) 2020, 2020, 2022    Influenza Vaccine Quad Recombinant 2018    Influenza split virus trivalent (PF) 10/12/2012, 10/24/2016    PFIZER PURPLE CAP SARS-COV-2 VACCINATION (12+) 2021, 2021    Pneumococcal polysaccharide vaccine (PPSV-23) 2017    Tdap Vaccine 2015     Last Pap: NILM, HPV neg 2021  Any abnormal pap smears?  no  Last Mammogram: last year, wnl  Last Colonoscopy: completed home FOBT last year, wnl  Last DEXA: Never. Parental hx of hip fracture after fall.      Gynecology History  Current Sexual Activity: yes - one male partner  Currently in a relationship: yes   Feel safe in your current relationship: yes   History of sexually transmitted diseases? yes - positive for chlamydia in , trich 2018  Abnormal discharge? no    Menstrual History  Patient's last menstrual period was 2024 (exact date).  Periods are regular every 23-26 days, lasting 3-4 days.   Dysmenorrhea:  none.   No intermenstrual bleeding, spotting, or discharge.    Contraception  Current: none    Cancer Risk Assessement:  Family history of:   - Breast cancer: aunt, dx 40s; grandmother, dx 75   - Ovarian cancer: sister, dx 20s   - Uterine cancer: no   - Colon cancer: no  Pt's BRCA and Roman testing negative.     Obstetric History  OB History    Para Term  AB Living   4 2 2   2 2   SAB IAB Ectopic Molar Multiple Live Births   1   1     2      # Outcome Date GA Lbr Isak/2nd Weight Sex Type Anes PTL Lv   4 Term 99 39w0d  6 lb 14 oz M Vag-Spont  N ELINA   3 Term 97 40w0d  9 lb 2 oz M CS-LTranv  N ELINA      Complications: Failure to Progress in Second Stage   2 Ectopic            1 SAB                Past Medical History  History reviewed. No pertinent past medical history.    Past Surgical History  Past Surgical History:   Procedure Laterality Date    CARPAL TUNNEL RELEASE Bilateral     PRIMARY C SECTION Bilateral        Social History  Social History     Tobacco Use    Smoking status: Never    Smokeless tobacco: Never   Vaping Use    Vaping status: Never Used   Substance Use Topics    Alcohol use: No    Drug use: No        Family History  Family History   Problem Relation Age of Onset    Lupus Mother     Heart Disease Father     No Known Problems Sister     No Known Problems Sister     No Known Problems Sister     No Known Problems Sister     Breast Cancer Maternal Aunt     Cancer Maternal Aunt         brain    Cancer Maternal Uncle         liver    Cancer Maternal Uncle     Stroke Maternal Grandmother     Breast Cancer Maternal Grandmother     Diabetes Neg Hx     Alcohol abuse Neg Hx     Drug abuse Neg Hx     Colorectal Cancer Neg Hx     Peritoneal Cancer Neg Hx     Ovarian Cancer Neg Hx     Tubal Cancer Neg Hx        Home Medications  Current Outpatient Medications   Medication Sig    albuterol 108 (90 Base) MCG/ACT Aero Soln inhalation aerosol Inhale 2 Puffs every four  "hours as needed for Shortness of Breath. (Patient not taking: Reported on 11/13/2024)    metroNIDAZOLE (METROGEL-VAGINAL) 0.75 % Gel Insert 1 Applicator into the vagina at bedtime. For 5 nights (Patient not taking: Reported on 11/13/2024)    ADVAIR DISKUS 250-50 MCG/DOSE AEROSOL POWDER, BREATH ACTIVATED TAKE 1 PUFF BY MOUTH EVERY 12 HOURS (Patient not taking: Reported on 11/13/2024)    albuterol (PROVENTIL) 2.5mg/3ml Nebu Soln solution for nebulization 3 mL by Nebulization route every four hours as needed for Shortness of Breath. (Patient not taking: Reported on 11/13/2024)       Allergies/Reactions  No Known Allergies    ROS  Review of Systems   All other systems reviewed and are negative.      Objective     BP (!) 130/91 (BP Location: Right arm, Patient Position: Sitting, BP Cuff Size: Adult)   Ht 4' 11\"   Wt 167 lb   LMP 11/12/2024 (Exact Date)   BMI 33.73 kg/m²     Physical Exam  Constitutional:       Appearance: Normal appearance.   Genitourinary:      Genitourinary Comments: Pt declined today due to being on menses. Prefers to do pelvic exam at next visit.   Eyes:      Conjunctiva/sclera: Conjunctivae normal.   Pulmonary:      Effort: Pulmonary effort is normal.   Abdominal:      Comments: Soft, mild tenderness to palpation in RLQ. No rebound, guarding, mass.   Neurological:      General: No focal deficit present.      Mental Status: She is alert.   Skin:     General: Skin is warm and dry.   Psychiatric:         Mood and Affect: Mood normal.         Behavior: Behavior normal.   Vitals reviewed. Exam conducted with a chaperone present.         Assessment & Plan     Nav Coates is a 44 y.o. female who presents today for Annual Gyn Exam.     #Health Maintenance   - Pap: wnl 2021, due 2026  - STI screening: ordered serum testing today, desires to complete GC/CT at next appt with pelvic exam  - Mammogram: scheduled for today  - Colonoscopy: Home fecal test last year wnl. Colonoscopy ordered  - DEXA: FRAX " 1.9%. Screening not indicated.  - Tobacco: nonsmoker  - Diabetes screening: ordered  - Cholesterol screening: up-to-date  - Counseling: STD prevention, HIV risk factors and prevention, and family planning choices  - Contraception: Declined. Discussed increased risk for ectopic pregnancy given her hx.    #Right Pelvic Pain  - Pt reported hx of salpingectomy for TOA, however upon further review of her external records, it appears she underwent a salpingostomy for an ectopic pregnancy in 2006.  - TVUS ordered to evaluate for etiology of pain  - Declined pelvic exam today due to menses. Prefers to complete exam at follow-up appt. Will plan to obtain GC/CT at that time.    Return: 2 months for imaging review and pelvic exam    Radha Caputo M.D.

## 2024-11-14 ENCOUNTER — APPOINTMENT (OUTPATIENT)
Dept: MEDICAL GROUP | Facility: PHYSICIAN GROUP | Age: 44
End: 2024-11-14
Payer: COMMERCIAL

## 2024-11-15 ENCOUNTER — OFFICE VISIT (OUTPATIENT)
Dept: MEDICAL GROUP | Facility: PHYSICIAN GROUP | Age: 44
End: 2024-11-15
Payer: COMMERCIAL

## 2024-11-15 ENCOUNTER — HOSPITAL ENCOUNTER (OUTPATIENT)
Facility: MEDICAL CENTER | Age: 44
End: 2024-11-15
Attending: STUDENT IN AN ORGANIZED HEALTH CARE EDUCATION/TRAINING PROGRAM
Payer: COMMERCIAL

## 2024-11-15 ENCOUNTER — APPOINTMENT (OUTPATIENT)
Dept: MEDICAL GROUP | Facility: PHYSICIAN GROUP | Age: 44
End: 2024-11-15
Payer: COMMERCIAL

## 2024-11-15 ENCOUNTER — HOSPITAL ENCOUNTER (OUTPATIENT)
Dept: LAB | Facility: MEDICAL CENTER | Age: 44
End: 2024-11-15
Attending: FAMILY MEDICINE
Payer: COMMERCIAL

## 2024-11-15 VITALS
BODY MASS INDEX: 34.07 KG/M2 | DIASTOLIC BLOOD PRESSURE: 68 MMHG | TEMPERATURE: 97.9 F | WEIGHT: 169 LBS | SYSTOLIC BLOOD PRESSURE: 112 MMHG | HEART RATE: 66 BPM | OXYGEN SATURATION: 98 % | HEIGHT: 59 IN

## 2024-11-15 DIAGNOSIS — E78.00 PURE HYPERCHOLESTEROLEMIA: ICD-10-CM

## 2024-11-15 DIAGNOSIS — Z13.41 ENCOUNTER FOR AUTISM SCREENING: ICD-10-CM

## 2024-11-15 DIAGNOSIS — Z11.3 SCREEN FOR STD (SEXUALLY TRANSMITTED DISEASE): ICD-10-CM

## 2024-11-15 DIAGNOSIS — F41.1 GENERALIZED ANXIETY DISORDER: ICD-10-CM

## 2024-11-15 DIAGNOSIS — K64.8 HEMORRHOIDS, INTERNAL: ICD-10-CM

## 2024-11-15 DIAGNOSIS — F33.1 MODERATE EPISODE OF RECURRENT MAJOR DEPRESSIVE DISORDER (HCC): Primary | ICD-10-CM

## 2024-11-15 DIAGNOSIS — Z13.1 SCREENING FOR DIABETES MELLITUS: ICD-10-CM

## 2024-11-15 LAB
ALBUMIN SERPL BCP-MCNC: 4 G/DL (ref 3.2–4.9)
ALBUMIN/GLOB SERPL: 1.2 G/DL
ALP SERPL-CCNC: 61 U/L (ref 30–99)
ALT SERPL-CCNC: 16 U/L (ref 2–50)
ANION GAP SERPL CALC-SCNC: 8 MMOL/L (ref 7–16)
AST SERPL-CCNC: 17 U/L (ref 12–45)
BILIRUB SERPL-MCNC: 0.2 MG/DL (ref 0.1–1.5)
BUN SERPL-MCNC: 11 MG/DL (ref 8–22)
CALCIUM ALBUM COR SERPL-MCNC: 9 MG/DL (ref 8.5–10.5)
CALCIUM SERPL-MCNC: 9 MG/DL (ref 8.5–10.5)
CHLORIDE SERPL-SCNC: 103 MMOL/L (ref 96–112)
CHOLEST SERPL-MCNC: 237 MG/DL (ref 100–199)
CO2 SERPL-SCNC: 27 MMOL/L (ref 20–33)
CREAT SERPL-MCNC: 0.74 MG/DL (ref 0.5–1.4)
EST. AVERAGE GLUCOSE BLD GHB EST-MCNC: 126 MG/DL
GFR SERPLBLD CREATININE-BSD FMLA CKD-EPI: 101 ML/MIN/1.73 M 2
GLOBULIN SER CALC-MCNC: 3.3 G/DL (ref 1.9–3.5)
GLUCOSE SERPL-MCNC: 98 MG/DL (ref 65–99)
HBA1C MFR BLD: 6 % (ref 4–5.6)
HBV SURFACE AG SER QL: NORMAL
HCV AB SER QL: NORMAL
HDLC SERPL-MCNC: 47 MG/DL
HIV 1+2 AB+HIV1 P24 AG SERPL QL IA: NORMAL
LDLC SERPL CALC-MCNC: 148 MG/DL
POTASSIUM SERPL-SCNC: 4.2 MMOL/L (ref 3.6–5.5)
PROT SERPL-MCNC: 7.3 G/DL (ref 6–8.2)
SODIUM SERPL-SCNC: 138 MMOL/L (ref 135–145)
T PALLIDUM AB SER QL IA: NORMAL
TRIGL SERPL-MCNC: 211 MG/DL (ref 0–149)

## 2024-11-15 PROCEDURE — 36415 COLL VENOUS BLD VENIPUNCTURE: CPT

## 2024-11-15 PROCEDURE — 86803 HEPATITIS C AB TEST: CPT

## 2024-11-15 PROCEDURE — 87389 HIV-1 AG W/HIV-1&-2 AB AG IA: CPT

## 2024-11-15 PROCEDURE — 3078F DIAST BP <80 MM HG: CPT | Performed by: FAMILY MEDICINE

## 2024-11-15 PROCEDURE — 80061 LIPID PANEL: CPT

## 2024-11-15 PROCEDURE — 99214 OFFICE O/P EST MOD 30 MIN: CPT | Performed by: FAMILY MEDICINE

## 2024-11-15 PROCEDURE — 83036 HEMOGLOBIN GLYCOSYLATED A1C: CPT

## 2024-11-15 PROCEDURE — 86780 TREPONEMA PALLIDUM: CPT

## 2024-11-15 PROCEDURE — 80053 COMPREHEN METABOLIC PANEL: CPT

## 2024-11-15 PROCEDURE — 87340 HEPATITIS B SURFACE AG IA: CPT

## 2024-11-15 PROCEDURE — 3074F SYST BP LT 130 MM HG: CPT | Performed by: FAMILY MEDICINE

## 2024-11-15 RX ORDER — PROPRANOLOL HYDROCHLORIDE 10 MG/1
10 TABLET ORAL 3 TIMES DAILY PRN
Qty: 90 TABLET | Refills: 0 | Status: SHIPPED | OUTPATIENT
Start: 2024-11-15

## 2024-11-15 ASSESSMENT — ANXIETY QUESTIONNAIRES
4. TROUBLE RELAXING: MORE THAN HALF THE DAYS
7. FEELING AFRAID AS IF SOMETHING AWFUL MIGHT HAPPEN: SEVERAL DAYS
5. BEING SO RESTLESS THAT IT IS HARD TO SIT STILL: SEVERAL DAYS
6. BECOMING EASILY ANNOYED OR IRRITABLE: MORE THAN HALF THE DAYS
3. WORRYING TOO MUCH ABOUT DIFFERENT THINGS: NEARLY EVERY DAY
2. NOT BEING ABLE TO STOP OR CONTROL WORRYING: MORE THAN HALF THE DAYS
GAD7 TOTAL SCORE: 14
1. FEELING NERVOUS, ANXIOUS, OR ON EDGE: NEARLY EVERY DAY

## 2024-11-15 ASSESSMENT — PATIENT HEALTH QUESTIONNAIRE - PHQ9
CLINICAL INTERPRETATION OF PHQ2 SCORE: 4
5. POOR APPETITE OR OVEREATING: 1 - SEVERAL DAYS
SUM OF ALL RESPONSES TO PHQ QUESTIONS 1-9: 15

## 2024-11-15 NOTE — PROGRESS NOTES
Verbal consent was acquired by the patient to use [employer] ambient listening note generation during this visit     Subjective:     HPI:   History of Present Illness  The patient presents for evaluation of multiple medical concerns.    She is due for a syphilis test and has additional lab work scheduled for today. She has completed the autism spectrum questionnaire from the [employer] and is seeking a referral for further testing.    She has been managing depression and anxiety without medication, having previously taken Celexa without noticeable improvement. She has been practicing meditation and grounding techniques. She is unsure if her insurance requires a referral for therapy. Her depression is not a new issue and has not worsened recently, but she has noticed it fluctuates with seasons and events. She reports no suicidal or homicidal thoughts. She has been off Celexa for several years and does not recall any benefits from it. She is interested in seeing a therapist and starting medication for her depression and anxiety. She has tried talk therapy but did not find it beneficial. She did not experience any side effects from Celexa, although it did cause drowsiness. Her anxiety has worsened over the past 3 months, with a few panic attacks characterized by rapid heart rate, confusion, fear, and chest pain. She believes these attacks are triggered by childhood trauma and abrupt changes. She has had recent testing with Dr. Caputo, her OB/GYN. She reports no chest pain, breathing difficulties, fevers, or chills.    She has been experiencing hemorrhoids for several years, which have recently become bothersome due to bleeding. She has tried over-the-counter treatments without success. The bleeding is painless and does not occur with every bowel movement. She also reports occasional itching and a protrusion from the anus. Her bowel movements are regular, soft, and brown, without any straining. She is unsure  of her fiber intake.    Depression Screening    Little interest or pleasure in doing things?  2 - more than half the days   Feeling down, depressed , or hopeless? 2 - more than half the days   Trouble falling or staying asleep, or sleeping too much?  3 - nearly every day   Feeling tired or having little energy?  2 - more than half the days   Poor appetite or overeating?  1 - several days   Feeling bad about yourself - or that you are a failure or have let yourself or your family down? 3 - nearly every day   Trouble concentrating on things, such as reading the newspaper or watching television? 2 - more than half the days   Moving or speaking so slowly that other people could have noticed.  Or the opposite - being so fidgety or restless that you have been moving around a lot more than usual?  0 - not at all   Thoughts that you would be better off dead, or of hurting yourself?  0 - not at all   Patient Health Questionnaire Score: 15       If depressive symptoms identified deferred to follow up visit unless specifically addressed in assesment and plan.    Interpretation of PHQ-9 Total Score   Score Severity   1-4 No Depression   5-9 Mild Depression   10-14 Moderate Depression   15-19 Moderately Severe Depression   20-27 Severe Depression    NANCY-7 Questionnaire    Feeling nervous, anxious, or on edge: Nearly every day  Not being able to sop or control worrying: More than half the days  Worrying too much about different things: Nearly every day  Trouble relaxing: More than half the days  Being so restless that it's hard to sit still: Several days  Becoming easily annoyed or irritable: More than half the days  Feeling afraid as if something awful might happen: Several days  Total: 14    Interpretation of NANCY 7 Total Score   Score Severity :  0-4 No Anxiety   5-9 Mild Anxiety  10-14 Moderate Anxiety  15-21 Severe Anxiety    Objective:     Exam:  /68 (BP Location: Left arm, Patient Position: Sitting, BP Cuff Size:  "Adult)   Pulse 66   Temp 36.6 °C (97.9 °F) (Temporal)   Ht 1.499 m (4' 11\")   Wt 76.7 kg (169 lb)   LMP 11/12/2024 (Exact Date)   SpO2 98%   BMI 34.13 kg/m²  Body mass index is 34.13 kg/m².    Physical Exam  Constitutional:       Appearance: Normal appearance.   Cardiovascular:      Rate and Rhythm: Normal rate and regular rhythm.      Heart sounds: Normal heart sounds.   Pulmonary:      Effort: Pulmonary effort is normal.      Breath sounds: Normal breath sounds.   Musculoskeletal:      Cervical back: Normal range of motion and neck supple.   Lymphadenopathy:      Cervical: No cervical adenopathy.   Neurological:      Mental Status: She is alert.             Results      Assessment & Plan:     1. Moderate episode of recurrent major depressive disorder (HCC)  Referral to Behavioral Health    Patient has been identified as having a positive depression screening. Appropriate orders and counseling have been given.      2. Generalized anxiety disorder  Referral to Behavioral Health      3. Encounter for autism screening  Referral to Behavioral Health      4. Pure hypercholesterolemia  Comp Metabolic Panel    Lipid Profile      5. Hemorrhoids, internal            Assessment & Plan  1. Depression.  Chronic, uncontrolled. She has been off medication for years and did not find Celexa helpful in the past. Fluoxetine 20 mg daily will be prescribed, with the understanding that full effects may take 4 to 6 weeks to manifest. Common side effects, including stomach issues, sleep disturbances, and sexual side effects, were discussed. She is advised to start taking the medication in the morning and adjust to evening if it causes drowsiness. Therapy referral has been ordered.     2. Anxiety.  Chronic, uncontrolled. Her anxiety has worsened over the past 3 months, with episodes of anxiety attacks characterized by a fast heart rate, confusion, and chest pain. She will start fluoxetine daily. Propranolol 10 mg will be prescribed " for use up to three times daily as needed for anxiety attacks. She is advised to keep the medication on hand to use during an anxiety attack. Therapy referral has been ordered.     3. Psychological Testing for Autism.  A referral for psychological testing for autism will be initiated. She will receive a [employer] message with details once the referral is processed and will need to call to schedule the appointment.    4. Internal Hemorrhoid.  Chronic. She reports painless bleeding and occasional itching with protrusion. She is advised to increase her water intake to approximately 80 ounces daily, adjusting based on weather and physical activity, and to aim for a daily fiber intake of at least 25 g. A list of high-fiber foods will be provided. Over-the-counter steroid cream may be used to alleviate itching. If symptoms persist, a GI referral may be considered for further evaluation and potential surgical intervention.    5. Health Maintenance.  A complete metabolic panel and cholesterol panel will be ordered, with the latter being nonfasting due to a concurrent A1c test ordered by her OB/GYN. The syphilis order remains valid.      Referral for genetic research was offered. Patient is a participant.      Return in about 2 months (around 1/15/2025) for Depression, PHQ9, Anxiety, GAD7.    Please note that this dictation was created using voice recognition software. I have made every reasonable attempt to correct obvious errors, but I expect that there are errors of grammar and possibly content that I did not discover before finalizing the note.

## 2024-11-15 NOTE — PATIENT INSTRUCTIONS
1)  Increasing water intake to about 80 ounces a day (a bit more than a half gallon of water) can help.  In addition, regular exercise can make an enormous difference besides being generally good for you anyway.  Making sure your diet includes plenty of fruits and vegetables is also very helpful.    2)  Adding additional fiber to your diet with products like metamucil, benefiber, citrucel, or psyllium can help by adding bulk to your stool, keeping it from getting quite so packed down. We recommend 25 grams of fiber per day for women and 32 grams of fiber per day for men.    Common Foods Fiber Content (grams)   Raspberries, 1 cup 8.0   Pear 5.5   Apple with skin 4.8   Green peas, cooked, 1 cup 8.8   Lentils, cooked, 1/2 cup 7.8   Black beans, cooked, 1/2 cup 7.5   Broccoli, cooked, 1 cup 5.2   Carrots, cooked, 1 cup 4.8   Shredded wheat, 1 cup 6.2   Whole wheat spaghetti, cooked, 1 cup 6.0   Bran flakes, 3/4 cup 5.5   Pumpkin seeds, 1 oz 5.2   Almonds, 1 oz 3.5   Sunflower seeds, 1 oz 3.1

## 2024-12-16 ENCOUNTER — APPOINTMENT (OUTPATIENT)
Dept: MEDICAL GROUP | Facility: PHYSICIAN GROUP | Age: 44
End: 2024-12-16
Payer: COMMERCIAL

## 2024-12-24 ENCOUNTER — APPOINTMENT (OUTPATIENT)
Dept: RESEARCH | Facility: MEDICAL CENTER | Age: 44
End: 2024-12-24
Payer: COMMERCIAL

## 2025-01-02 ENCOUNTER — APPOINTMENT (OUTPATIENT)
Dept: MEDICAL GROUP | Facility: PHYSICIAN GROUP | Age: 45
End: 2025-01-02
Payer: COMMERCIAL

## 2025-01-02 VITALS
HEIGHT: 59 IN | HEART RATE: 60 BPM | TEMPERATURE: 98 F | OXYGEN SATURATION: 98 % | WEIGHT: 152.4 LBS | RESPIRATION RATE: 16 BRPM | DIASTOLIC BLOOD PRESSURE: 64 MMHG | SYSTOLIC BLOOD PRESSURE: 110 MMHG | BODY MASS INDEX: 30.72 KG/M2

## 2025-01-02 DIAGNOSIS — F41.1 GENERALIZED ANXIETY DISORDER: Primary | ICD-10-CM

## 2025-01-02 DIAGNOSIS — R73.03 PREDIABETES: ICD-10-CM

## 2025-01-02 DIAGNOSIS — Z23 NEED FOR VACCINATION: ICD-10-CM

## 2025-01-02 DIAGNOSIS — E78.00 PURE HYPERCHOLESTEROLEMIA: ICD-10-CM

## 2025-01-02 DIAGNOSIS — F33.2 SEVERE EPISODE OF RECURRENT MAJOR DEPRESSIVE DISORDER, WITHOUT PSYCHOTIC FEATURES (HCC): ICD-10-CM

## 2025-01-02 PROCEDURE — 3074F SYST BP LT 130 MM HG: CPT | Performed by: FAMILY MEDICINE

## 2025-01-02 PROCEDURE — 99214 OFFICE O/P EST MOD 30 MIN: CPT | Mod: 25 | Performed by: FAMILY MEDICINE

## 2025-01-02 PROCEDURE — 3078F DIAST BP <80 MM HG: CPT | Performed by: FAMILY MEDICINE

## 2025-01-02 PROCEDURE — 90471 IMMUNIZATION ADMIN: CPT

## 2025-01-02 PROCEDURE — 90746 HEPB VACCINE 3 DOSE ADULT IM: CPT

## 2025-01-02 ASSESSMENT — ANXIETY QUESTIONNAIRES
GAD7 TOTAL SCORE: 14
3. WORRYING TOO MUCH ABOUT DIFFERENT THINGS: SEVERAL DAYS
7. FEELING AFRAID AS IF SOMETHING AWFUL MIGHT HAPPEN: SEVERAL DAYS
6. BECOMING EASILY ANNOYED OR IRRITABLE: NEARLY EVERY DAY
2. NOT BEING ABLE TO STOP OR CONTROL WORRYING: MORE THAN HALF THE DAYS
4. TROUBLE RELAXING: MORE THAN HALF THE DAYS
5. BEING SO RESTLESS THAT IT IS HARD TO SIT STILL: MORE THAN HALF THE DAYS
1. FEELING NERVOUS, ANXIOUS, OR ON EDGE: NEARLY EVERY DAY

## 2025-01-02 ASSESSMENT — PATIENT HEALTH QUESTIONNAIRE - PHQ9
CLINICAL INTERPRETATION OF PHQ2 SCORE: 6
SUM OF ALL RESPONSES TO PHQ QUESTIONS 1-9: 16
5. POOR APPETITE OR OVEREATING: 3 - NEARLY EVERY DAY

## 2025-01-02 NOTE — LETTER
2025    Re: Nav Coates  : 1980    Due to severe anxiety, please provide the reasonable accommodation of a late lunch, after 12:30 pm.    Thank you,  Sarah Ramírez M.D.

## 2025-01-02 NOTE — PROGRESS NOTES
Verbal consent was acquired by the patient to use [employer] ambient listening note generation during this visit     Subjective:     HPI:   History of Present Illness  The patient presents for evaluation of anxiety, depression, and elevated cholesterol.    Anxiety and Depression  She experiences heightened anxiety during lunchtime at her workplace, which often leads to panic attacks. She has been utilizing propranolol as a coping mechanism, finding it beneficial but requiring frequent use, sometimes exceeding 3 times daily. She also reports experiencing anxiety outside of lunchtime, with symptoms intensifying during lunch hours. She has been on a daily regimen of fluoxetine for approximately 1 month, which she reports as being somewhat effective in managing her depression. However, she notes that her anxiety exacerbates her depressive symptoms, leading to sleep disturbances and loss of appetite. She has a scheduled appointment with a therapist on 01/27/2025. She reports no recent episodes of chest pain, respiratory distress, fevers, or chills.  - Onset: Heightened anxiety during lunchtime at work; on fluoxetine for 1 month.  - Location: Not specified.  - Duration: Anxiety during lunch hours; fluoxetine regimen for 1 month.  - Character: Heightened anxiety, panic attacks, depressive symptoms, sleep disturbances, loss of appetite.  - Alleviating/Aggravating Factors: Propranolol helps but requires frequent use; anxiety exacerbates depression.  - Timing: Anxiety intensifies during lunch hours; fluoxetine taken daily.  - Severity: Requires propranolol more than 3 times daily; somewhat effective fluoxetine; sleep disturbances and loss of appetite.    Elevated Cholesterol  She believes her cholesterol levels were slightly elevated and is requesting a recheck.  - Onset: Not specified.  - Location: Not specified.  - Duration: Not specified.  - Character: Slightly elevated cholesterol levels.  - Alleviating/Aggravating  Factors: Not specified.  - Timing: Not specified.  - Severity: Slightly elevated.    Prediabetes  She had a high A1c level in the past, indicating prediabetes, and is requesting a recheck.  - Onset: Not specified.  - Location: Not specified.  - Duration: Not specified.  - Character: High A1c level indicating prediabetes.  - Alleviating/Aggravating Factors: Not specified.  - Timing: Not specified.  - Severity: High A1c level.    She has received her influenza vaccine and COVID-19 booster at [facility].    MEDICATIONS  Current: propranolol, fluoxetine    IMMUNIZATIONS  She has received her influenza vaccine and COVID-19 booster at [facility].    Depression Screening    Little interest or pleasure in doing things?  3 - nearly every day   Feeling down, depressed , or hopeless? 3 - nearly every day   Trouble falling or staying asleep, or sleeping too much?  3 - nearly every day   Feeling tired or having little energy?  1 - several days   Poor appetite or overeating?  3 - nearly every day   Feeling bad about yourself - or that you are a failure or have let yourself or your family down? 1 - several days   Trouble concentrating on things, such as reading the newspaper or watching television? 2 - more than half the days   Moving or speaking so slowly that other people could have noticed.  Or the opposite - being so fidgety or restless that you have been moving around a lot more than usual?  0 - not at all   Thoughts that you would be better off dead, or of hurting yourself?  0 - not at all   Patient Health Questionnaire Score: 16       If depressive symptoms identified deferred to follow up visit unless specifically addressed in assesment and plan.    Interpretation of PHQ-9 Total Score   Score Severity   1-4 No Depression   5-9 Mild Depression   10-14 Moderate Depression   15-19 Moderately Severe Depression   20-27 Severe Depression    NANCY-7 Questionnaire    Feeling nervous, anxious, or on edge: Nearly every day  Not being able to sop or  "control worrying: More than half the days  Worrying too much about different things: Several days  Trouble relaxing: More than half the days  Being so restless that it's hard to sit still: More than half the days  Becoming easily annoyed or irritable: Nearly every day  Feeling afraid as if something awful might happen: Several days  Total: 14    Interpretation of NANCY 7 Total Score   Score Severity :  0-4 No Anxiety   5-9 Mild Anxiety  10-14 Moderate Anxiety  15-21 Severe Anxiety    Health Maintenance: Completed    Objective:     Exam:  /64 (BP Location: Left arm, Patient Position: Sitting, BP Cuff Size: Adult)   Pulse 60   Temp 36.7 °C (98 °F) (Temporal)   Resp 16   Ht 1.499 m (4' 11\")   Wt 69.1 kg (152 lb 6.4 oz)   SpO2 98%   BMI 30.78 kg/m²  Body mass index is 30.78 kg/m².    Physical Exam  Constitutional:       Appearance: Normal appearance.   Cardiovascular:      Rate and Rhythm: Normal rate and regular rhythm.      Heart sounds: Normal heart sounds.   Pulmonary:      Effort: Pulmonary effort is normal.      Breath sounds: Normal breath sounds.   Musculoskeletal:      Cervical back: Normal range of motion and neck supple.   Lymphadenopathy:      Cervical: No cervical adenopathy.   Neurological:      Mental Status: She is alert.             Results  Laboratory Studies  Cholesterol levels were slightly elevated but improved compared to a year ago.    Assessment & Plan:     1. Generalized anxiety disorder        2. Severe episode of recurrent major depressive disorder, without psychotic features (HCC)  Patient has been identified as having a positive depression screening. Appropriate orders and counseling have been given.      3. Pure hypercholesterolemia        4. Need for vaccination  Hep B Adult 20+          Assessment & Plan  1. Anxiety - Chronic, uncontrolled. Anxiety levels remain unchanged, particularly severe during lunchtime at work, leading to panic attacks and loss of appetite.  - Increase " fluoxetine to 40 mg daily  - Continue propranolol 10 mg every 8 hours as needed, with the option to increase to 20 mg if necessary  - Letter provided to employer requesting a later lunch break after 12:30 PM  - Scheduled to see a therapist on 01/27/2024    2. Depression - Chronic, uncontrolled. Depression scores have not shown significant improvement, worsens when anxiety is not controlled, leading to loss of appetite and sleep disturbances.  - Increase fluoxetine to 40 mg daily  - Monitor symptoms and report any changes    3. Elevated cholesterol - Chronic. Cholesterol levels are slightly elevated but improved compared to the previous year, with a 10-year risk of heart attack or stroke remaining low at 1%.  - No immediate recheck necessary, reassess in a year    4. Prediabetes - A1c levels will be rechecked on 02/15/2025, as it is too soon for another test currently.    5. Health maintenance - Will receive the first dose of the hepatitis B vaccine today.      Referral for genetic research was offered. Patient is a participant.    Return in about 4 weeks (around 1/30/2025) for Depression, PHQ9, Anxiety, GAD7; Hep B #2?.    Please note that this dictation was created using voice recognition software. I have made every reasonable attempt to correct obvious errors, but I expect that there are errors of grammar and possibly content that I did not discover before finalizing the note.

## 2025-01-16 ENCOUNTER — APPOINTMENT (OUTPATIENT)
Dept: MEDICAL GROUP | Facility: PHYSICIAN GROUP | Age: 45
End: 2025-01-16
Payer: COMMERCIAL

## 2025-02-04 ENCOUNTER — HOSPITAL ENCOUNTER (OUTPATIENT)
Dept: RADIOLOGY | Facility: MEDICAL CENTER | Age: 45
End: 2025-02-04
Attending: STUDENT IN AN ORGANIZED HEALTH CARE EDUCATION/TRAINING PROGRAM
Payer: COMMERCIAL

## 2025-02-04 DIAGNOSIS — R10.2 PELVIC PAIN: ICD-10-CM

## 2025-02-04 PROCEDURE — 76830 TRANSVAGINAL US NON-OB: CPT

## 2025-02-05 ENCOUNTER — HOSPITAL ENCOUNTER (OUTPATIENT)
Dept: LAB | Facility: MEDICAL CENTER | Age: 45
End: 2025-02-05
Attending: ORTHOPAEDIC SURGERY
Payer: COMMERCIAL

## 2025-02-05 DIAGNOSIS — M79.641 BILATERAL HAND PAIN: ICD-10-CM

## 2025-02-05 DIAGNOSIS — G56.03 CARPAL TUNNEL SYNDROME, BILATERAL: ICD-10-CM

## 2025-02-05 DIAGNOSIS — M79.642 BILATERAL HAND PAIN: ICD-10-CM

## 2025-02-05 LAB
25(OH)D3 SERPL-MCNC: 38 NG/ML (ref 30–100)
BASOPHILS # BLD AUTO: 1 % (ref 0–1.8)
BASOPHILS # BLD: 0.03 K/UL (ref 0–0.12)
C3 SERPL-MCNC: 158 MG/DL (ref 87–200)
C4 SERPL-MCNC: 29.1 MG/DL (ref 19–52)
CRP SERPL HS-MCNC: <0.3 MG/DL (ref 0–0.75)
EOSINOPHIL # BLD AUTO: 0.06 K/UL (ref 0–0.51)
EOSINOPHIL NFR BLD: 2 % (ref 0–6.9)
ERYTHROCYTE [DISTWIDTH] IN BLOOD BY AUTOMATED COUNT: 40.4 FL (ref 35.9–50)
ERYTHROCYTE [SEDIMENTATION RATE] IN BLOOD BY WESTERGREN METHOD: 7 MM/HOUR (ref 0–25)
HCT VFR BLD AUTO: 45.7 % (ref 37–47)
HGB BLD-MCNC: 14.9 G/DL (ref 12–16)
IMM GRANULOCYTES # BLD AUTO: 0 K/UL (ref 0–0.11)
IMM GRANULOCYTES NFR BLD AUTO: 0 % (ref 0–0.9)
LYMPHOCYTES # BLD AUTO: 1.02 K/UL (ref 1–4.8)
LYMPHOCYTES NFR BLD: 33.2 % (ref 22–41)
MCH RBC QN AUTO: 29.4 PG (ref 27–33)
MCHC RBC AUTO-ENTMCNC: 32.6 G/DL (ref 32.2–35.5)
MCV RBC AUTO: 90.1 FL (ref 81.4–97.8)
MONOCYTES # BLD AUTO: 0.14 K/UL (ref 0–0.85)
MONOCYTES NFR BLD AUTO: 4.6 % (ref 0–13.4)
NEUTROPHILS # BLD AUTO: 1.82 K/UL (ref 1.82–7.42)
NEUTROPHILS NFR BLD: 59.2 % (ref 44–72)
NRBC # BLD AUTO: 0 K/UL
NRBC BLD-RTO: 0 /100 WBC (ref 0–0.2)
PLATELET # BLD AUTO: 275 K/UL (ref 164–446)
PMV BLD AUTO: 11 FL (ref 9–12.9)
RBC # BLD AUTO: 5.07 M/UL (ref 4.2–5.4)
RHEUMATOID FACT SER IA-ACNC: <10 IU/ML (ref 0–14)
URATE SERPL-MCNC: 4.1 MG/DL (ref 1.9–8.2)
WBC # BLD AUTO: 3.1 K/UL (ref 4.8–10.8)

## 2025-02-05 PROCEDURE — 86140 C-REACTIVE PROTEIN: CPT

## 2025-02-05 PROCEDURE — 86160 COMPLEMENT ANTIGEN: CPT

## 2025-02-05 PROCEDURE — 86812 HLA TYPING A B OR C: CPT

## 2025-02-05 PROCEDURE — 36415 COLL VENOUS BLD VENIPUNCTURE: CPT

## 2025-02-05 PROCEDURE — 85025 COMPLETE CBC W/AUTO DIFF WBC: CPT

## 2025-02-05 PROCEDURE — 82306 VITAMIN D 25 HYDROXY: CPT

## 2025-02-05 PROCEDURE — 83520 IMMUNOASSAY QUANT NOS NONAB: CPT

## 2025-02-05 PROCEDURE — 86038 ANTINUCLEAR ANTIBODIES: CPT

## 2025-02-05 PROCEDURE — 86431 RHEUMATOID FACTOR QUANT: CPT

## 2025-02-05 PROCEDURE — 86225 DNA ANTIBODY NATIVE: CPT

## 2025-02-05 PROCEDURE — 86235 NUCLEAR ANTIGEN ANTIBODY: CPT

## 2025-02-05 PROCEDURE — 84550 ASSAY OF BLOOD/URIC ACID: CPT

## 2025-02-05 PROCEDURE — 85652 RBC SED RATE AUTOMATED: CPT

## 2025-02-05 PROCEDURE — 86162 COMPLEMENT TOTAL (CH50): CPT

## 2025-02-07 LAB
CH50 SERPL-ACNC: 72.9 U/ML (ref 38.7–89.9)
DSDNA AB TITR SER CLIF: NORMAL {TITER}
ENA SM IGG SER-ACNC: 2 AU/ML (ref 0–40)
HLA-B27 QL FC: NEGATIVE
NUCLEAR IGG SER QL IA: NORMAL
TSH RECEP AB SER-ACNC: <1.1 IU/L

## 2025-02-13 ENCOUNTER — APPOINTMENT (OUTPATIENT)
Dept: MEDICAL GROUP | Facility: PHYSICIAN GROUP | Age: 45
End: 2025-02-13
Payer: COMMERCIAL

## 2025-02-14 ENCOUNTER — OFFICE VISIT (OUTPATIENT)
Dept: MEDICAL GROUP | Facility: PHYSICIAN GROUP | Age: 45
End: 2025-02-14
Payer: COMMERCIAL

## 2025-02-14 VITALS
HEART RATE: 63 BPM | DIASTOLIC BLOOD PRESSURE: 64 MMHG | BODY MASS INDEX: 30.24 KG/M2 | HEIGHT: 59 IN | SYSTOLIC BLOOD PRESSURE: 118 MMHG | WEIGHT: 150 LBS | OXYGEN SATURATION: 99 %

## 2025-02-14 DIAGNOSIS — F41.1 GENERALIZED ANXIETY DISORDER: ICD-10-CM

## 2025-02-14 DIAGNOSIS — F33.2 SEVERE EPISODE OF RECURRENT MAJOR DEPRESSIVE DISORDER, WITHOUT PSYCHOTIC FEATURES (HCC): Primary | ICD-10-CM

## 2025-02-14 PROCEDURE — 99214 OFFICE O/P EST MOD 30 MIN: CPT | Performed by: FAMILY MEDICINE

## 2025-02-14 PROCEDURE — 3074F SYST BP LT 130 MM HG: CPT | Performed by: FAMILY MEDICINE

## 2025-02-14 PROCEDURE — 3078F DIAST BP <80 MM HG: CPT | Performed by: FAMILY MEDICINE

## 2025-02-14 RX ORDER — ESCITALOPRAM OXALATE 10 MG/1
TABLET ORAL
Qty: 74 TABLET | Refills: 0 | Status: SHIPPED | OUTPATIENT
Start: 2025-02-14 | End: 2025-02-28

## 2025-02-14 ASSESSMENT — ANXIETY QUESTIONNAIRES
3. WORRYING TOO MUCH ABOUT DIFFERENT THINGS: NEARLY EVERY DAY
7. FEELING AFRAID AS IF SOMETHING AWFUL MIGHT HAPPEN: MORE THAN HALF THE DAYS
5. BEING SO RESTLESS THAT IT IS HARD TO SIT STILL: MORE THAN HALF THE DAYS
1. FEELING NERVOUS, ANXIOUS, OR ON EDGE: NEARLY EVERY DAY
4. TROUBLE RELAXING: MORE THAN HALF THE DAYS
GAD7 TOTAL SCORE: 18
6. BECOMING EASILY ANNOYED OR IRRITABLE: NEARLY EVERY DAY
2. NOT BEING ABLE TO STOP OR CONTROL WORRYING: NEARLY EVERY DAY

## 2025-02-14 ASSESSMENT — PATIENT HEALTH QUESTIONNAIRE - PHQ9
SUM OF ALL RESPONSES TO PHQ QUESTIONS 1-9: 25
5. POOR APPETITE OR OVEREATING: 3 - NEARLY EVERY DAY
CLINICAL INTERPRETATION OF PHQ2 SCORE: 6

## 2025-02-14 ASSESSMENT — FIBROSIS 4 INDEX: FIB4 SCORE: 0.71

## 2025-02-14 NOTE — PROGRESS NOTES
Verbal consent was acquired by the patient to use [employer] ambient listening note generation during this visit     Subjective:     HPI:   History of Present Illness  The patient presents for evaluation of anxiety and depression.    Anxiety and Depression  - She has been on a leave of absence from work since Tuesday, following a demotion.  - She is currently on final notice at her job, which has significantly contributed to her stress levels.  - She reports experiencing suicidal ideation, although without a specific plan.  - These thoughts began approximately 2 weeks ago, following an unintentional incident at work.  - She does not attribute these thoughts to the increased dosage of Prozac.  - She discontinued Prozac about a week ago due to concerns about potential overdose.  - She reports increased anxiety since discontinuing the medication but notes that alcohol consumption has provided some relief.  - She has missed several therapy appointments and workdays.  - She does not have any immediate plans for suicide and expresses a desire for her current feelings to subside rather than a wish to die.  - She has not scheduled any new therapy appointments and reports that Prozac was not beneficial.  - She is uncertain about when her depression and anxiety symptoms first manifested.  - She was previously employed as a   but was reassigned to a lower-speed machine due to the high-speed machine exacerbating her anxiety.  - She is seeking continuous leave from work.    Supplemental Information: None    SOCIAL HISTORY  She admits to drinking wine.    MEDICATIONS  Discontinued: Prozac  Past: Celexa    Depression Screening    Little interest or pleasure in doing things?  3 - nearly every day   Feeling down, depressed , or hopeless? 3 - nearly every day   Trouble falling or staying asleep, or sleeping too much?  3 - nearly every day   Feeling tired or having little energy?  3 - nearly every day   Poor  "appetite or overeating?  3 - nearly every day   Feeling bad about yourself - or that you are a failure or have let yourself or your family down? 3 - nearly every day   Trouble concentrating on things, such as reading the newspaper or watching television? 3 - nearly every day   Moving or speaking so slowly that other people could have noticed.  Or the opposite - being so fidgety or restless that you have been moving around a lot more than usual?  2 - more than half the days   Thoughts that you would be better off dead, or of hurting yourself?  2 - more than half the days   Patient Health Questionnaire Score: 25       If depressive symptoms identified deferred to follow up visit unless specifically addressed in assesment and plan.    Interpretation of PHQ-9 Total Score   Score Severity   1-4 No Depression   5-9 Mild Depression   10-14 Moderate Depression   15-19 Moderately Severe Depression   20-27 Severe Depression    NANCY-7 Questionnaire    Feeling nervous, anxious, or on edge: Nearly every day  Not being able to sop or control worrying: Nearly every day  Worrying too much about different things: Nearly every day  Trouble relaxing: More than half the days  Being so restless that it's hard to sit still: More than half the days  Becoming easily annoyed or irritable: Nearly every day  Feeling afraid as if something awful might happen: More than half the days  Total: 18    Interpretation of NANCY 7 Total Score   Score Severity :  0-4 No Anxiety   5-9 Mild Anxiety  10-14 Moderate Anxiety  15-21 Severe Anxiety      Health Maintenance: deferred until next visit    Objective:     Exam:  /64 (BP Location: Left arm, Patient Position: Sitting, BP Cuff Size: Adult)   Pulse 63   Ht 1.499 m (4' 11\")   Wt 68 kg (150 lb)   SpO2 99%   BMI 30.30 kg/m²  Body mass index is 30.3 kg/m².    Physical Exam  Constitutional:       General: She is in acute distress.   Cardiovascular:      Rate and Rhythm: Normal rate and regular " - Continue home HIV medications  Rayataz 300 mg po daily, ritonovir 100 mg po daily,  Descovy 1 tab po daily  - Patient does not take bactrim DS 1 tab po 3x/ week, azithro 1200 mg po weekly on sat. Will administer per schedule while in hospital for prophylaxis  - outpt f/u with [facility] clinic rhythm.      Heart sounds: Normal heart sounds.   Pulmonary:      Effort: Pulmonary effort is normal.      Breath sounds: Normal breath sounds.   Musculoskeletal:      Cervical back: Normal range of motion and neck supple.   Lymphadenopathy:      Cervical: No cervical adenopathy.   Neurological:      Mental Status: She is alert.             Results      Assessment & Plan:     1. Severe episode of recurrent major depressive disorder, without psychotic features (HCC)        2. Generalized anxiety disorder            Assessment & Plan  1. Depression: Chronic and uncontrolled. Depression questionnaire score has worsened compared to the last visit, indicating severe symptoms with some suicidal ideation. Stopped taking Prozac 40 mg daily about a week ago due to concerns about intentionally overdosing. Reports no current plan for suicide but has had fleeting thoughts. Did not find Prozac helpful.  - Start escitalopram 10 mg daily, increasing to 20 mg daily after 2 weeks  - Schedule an appointment with a therapist  - Call sister if suicidal thoughts with a plan recur  - Identify a backup person to contact if sister is unavailable  - Go to the emergency room if unable to find a backup person and suicidal thoughts with a plan become very strong  - Completed [place] paperwork    2. Anxiety: Chronic and uncontrolled. Severe symptoms appear to be triggered by work stress.  - Start escitalopram as discussed above  - Completed FMLA paperwork for a 12-week leave from work      Referral for genetic research was offered. Patient is a participant.      Return in about 4 weeks (around 3/14/2025) for Anxiety, GAD7, Depression, PHQ9.    Please note that this dictation was created using voice recognition software. I have made every reasonable attempt to correct obvious errors, but I expect that there are errors of grammar and possibly content that I did not discover before finalizing the note.

## 2025-02-17 NOTE — PROGRESS NOTES
[facility]  GYN RETURN VISIT    CC:  Gynecologic Exam      Subjective   HPI: Patient is a 44 y.o.  who presents for follow up.    She was last seeen 11/3/24 for her annual exam and reported right pelvic pain. She deferred a pelvic exam at that appointment. A TVUS was completed that was largely wnl aside from an endometrial thickness of 1.72cm.     Today she denies any further pelvic pain and pressure. She reports a desire for fertility, and has been actively trying to conceive for the last eight months with her current partner. She's worried that the TVUS did not specifically report on her fallopian tubes.     Patient History  Prior pregnancies? Yes, two prior to ectopic pregnancy  Menses are regular every 23-26 days, lasting 3-4 days.   History of PCOS, thyroid abnormalities, etc: no  Family history of birth defects, developmental delay, early menopause, or reproductive problems: no  Prior work-up for infertility: no  Hirsuitism? no  Acne? no  Diabetes or glucose intolerance? no  Obesity? no  Breast discharge? 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 yes  Any pertinent medical or surgical history for partner? No PMH or PSH  Any problems with erection/ejaculation? no  Semen analysis performed already? no  Family history of birth defects, developmental delay, early menopause, or reproductive problems: Earnestine's chorea (two maternal great uncles)  Medication use: no  Use of nicotine, alcohol, drugs? Vapes, occassional alcohol use (3x per week)  Current or prior anabolic steroids or supplements: perviously used testosterone for three years, quit four years ago    General:  - Patient and partner have been TTC for 8 months  - Frequency of vaginal intercourse: every 1-2 days   - Use of LH strips?: no  - Tracking cycles?: yes  - Use of lubricants?: yes    Review of Systems   All  "other systems reviewed and are negative.      No past medical history on file.  Past Surgical History:   Procedure Laterality Date    CARPAL TUNNEL RELEASE Bilateral 2019 September, October    SALPINGOSTOMY  2006    ectopic pregnancy    PRIMARY C SECTION Bilateral        Current Outpatient Medications:     escitalopram (LEXAPRO) 10 MG Tab, Take 1 Tablet by mouth every day for 14 days, THEN 2 Tablets every day for 30 days., Disp: 74 Tablet, Rfl: 0    propranolol (INDERAL) 10 MG Tab, Take 1 Tablet by mouth 3 times a day as needed (anxiety attack)., Disp: 90 Tablet, Rfl: 0    albuterol 108 (90 Base) MCG/ACT Aero Soln inhalation aerosol, Inhale 2 Puffs every four hours as needed for Shortness of Breath., Disp: 25.5 Each, Rfl: 0    ADVAIR DISKUS 250-50 MCG/DOSE AEROSOL POWDER, BREATH ACTIVATED, TAKE 1 PUFF BY MOUTH EVERY 12 HOURS, Disp: 60 Each, Rfl: 3    albuterol (PROVENTIL) 2.5mg/3ml Nebu Soln solution for nebulization, 3 mL by Nebulization route every four hours as needed for Shortness of Breath., Disp: 30 Bullet, Rfl: 2  No Known Allergies    Objective   Vital Signs:   Vitals:    02/18/25 0758   BP: 127/83   BP Location: Right arm   Patient Position: Sitting   BP Cuff Size: Adult   Weight: 148 lb 12.8 oz   Height: 4' 11\"     Body mass index is 30.05 kg/m².    Physical Exam  Constitutional:       General: She is not in acute distress.     Appearance: Normal appearance.   Genitourinary:      No lesions in the vagina.      Right Labia: No rash, tenderness or lesions.     Left Labia: No tenderness, lesions or rash.     Vaginal bleeding present.      No vaginal discharge or tenderness.        Right Adnexa: not tender and not full.     Left Adnexa: not tender, not full and no mass present.     No cervical friability or polyp.      Uterus is fixed.      Uterus is not enlarged, tender or irregular.      No urethral prolapse present.   Eyes:      Conjunctiva/sclera: Conjunctivae normal.   Pulmonary:      Effort: Pulmonary " effort is normal.   Abdominal:      General: There is no distension.      Palpations: Abdomen is soft.      Tenderness: There is no abdominal tenderness. There is no guarding.   Neurological:      General: No focal deficit present.      Mental Status: She is alert.   Skin:     General: Skin is warm and dry.   Psychiatric:         Mood and Affect: Mood normal.         Behavior: Behavior normal.   Vitals and nursing note reviewed. Exam conducted with a chaperone present.         LABS & IMAGING  US-PELVIC COMPLETE (TRANSABDOMINAL/TRANSVAGINAL) (COMBO) 2025    Narrative  2025 9:52 AM    HISTORY/REASON FOR EXAM:  Pain; hx of TOA on right, worsening pain in that area    TECHNIQUE/EXAM DESCRIPTION:  Transabdominal and transvaginal pelvic ultrasound.    COMPARISON:   Limited abdominal ultrasound 2023    FINDINGS:    Difficult exam secondary to patient's body habitus  Both transabdominal and transvaginal scanning were performed to optimally visualize the pelvis.    UTERUS:  The uterus measures 6.45 cm x 10.10 cm x 8.25 cm.  The uterine myometrium is within normal limits.  The endometrial echo complex measures 1.72 cm.  The endometrium is unremarkable in appearance and thickness for age and menstrual status.    OVARIES:  The right ovary measures 2.84 cm x 1.24 cm x 1.97 cm. Duplex Doppler examination of the right ovary shows normal waveforms.    The left ovary measures 2.04 cm x 2.12 cm x 2.45 cm. Duplex Doppler examination of the left ovary shows normal waveforms.    There is no free fluid seen.    Impression  1.  Thickened endometrial stripe.  2.  No definite ultrasound evidence of tubo-ovarian abscess formation.      Assessment & Plan   44 y.o.  presents to discuss infertility    #Secondary infertility  - Discussed various etiologies of infertility including ovulatory dysfunction, cervical factors, tubal factors, male factors, uterine factors, and diminished ovarian reserve. Discussed workup for each  including HSG (especially in light of her hx of ectopic and salpingostomy), AMH to assess ovarian reserve, and semen analysis. Counseled her that given her age, she may not be able to conceive naturally or may require an egg donor. Recommended referral to [facility] for management, pt accepted. She preferred workup begin at [facility], so HSG and lab work including AMH ordered.  - GC/CT obtained to complete STI testing     Total Time: 33 minutes spent in chart review, exam, counseling and documentation    Radha Caputo M.D.

## 2025-02-18 ENCOUNTER — HOSPITAL ENCOUNTER (OUTPATIENT)
Facility: MEDICAL CENTER | Age: 45
End: 2025-02-18
Attending: STUDENT IN AN ORGANIZED HEALTH CARE EDUCATION/TRAINING PROGRAM
Payer: COMMERCIAL

## 2025-02-18 ENCOUNTER — OFFICE VISIT (OUTPATIENT)
Dept: OBGYN | Facility: CLINIC | Age: 45
End: 2025-02-18
Payer: COMMERCIAL

## 2025-02-18 VITALS
SYSTOLIC BLOOD PRESSURE: 127 MMHG | WEIGHT: 148.8 LBS | DIASTOLIC BLOOD PRESSURE: 83 MMHG | BODY MASS INDEX: 30 KG/M2 | HEIGHT: 59 IN

## 2025-02-18 DIAGNOSIS — Z11.3 SCREEN FOR STD (SEXUALLY TRANSMITTED DISEASE): ICD-10-CM

## 2025-02-18 DIAGNOSIS — Z31.69 INFERTILITY COUNSELING: Primary | ICD-10-CM

## 2025-02-18 PROCEDURE — 99459 PELVIC EXAMINATION: CPT | Performed by: STUDENT IN AN ORGANIZED HEALTH CARE EDUCATION/TRAINING PROGRAM

## 2025-02-18 PROCEDURE — 3079F DIAST BP 80-89 MM HG: CPT | Performed by: STUDENT IN AN ORGANIZED HEALTH CARE EDUCATION/TRAINING PROGRAM

## 2025-02-18 PROCEDURE — 87491 CHLMYD TRACH DNA AMP PROBE: CPT

## 2025-02-18 PROCEDURE — 99214 OFFICE O/P EST MOD 30 MIN: CPT | Performed by: STUDENT IN AN ORGANIZED HEALTH CARE EDUCATION/TRAINING PROGRAM

## 2025-02-18 PROCEDURE — 87591 N.GONORRHOEAE DNA AMP PROB: CPT

## 2025-02-18 PROCEDURE — 3074F SYST BP LT 130 MM HG: CPT | Performed by: STUDENT IN AN ORGANIZED HEALTH CARE EDUCATION/TRAINING PROGRAM

## 2025-02-18 ASSESSMENT — FIBROSIS 4 INDEX: FIB4 SCORE: 0.71

## 2025-02-19 ENCOUNTER — RESULTS FOLLOW-UP (OUTPATIENT)
Dept: OBGYN | Facility: MEDICAL CENTER | Age: 45
End: 2025-02-19
Payer: COMMERCIAL

## 2025-02-19 LAB
C TRACH DNA GENITAL QL NAA+PROBE: NEGATIVE
N GONORRHOEA DNA GENITAL QL NAA+PROBE: NEGATIVE
SPECIMEN SOURCE: NORMAL

## 2025-02-28 RX ORDER — ESCITALOPRAM OXALATE 10 MG/1
20 TABLET ORAL DAILY
Qty: 180 TABLET | Refills: 0 | Status: SHIPPED | OUTPATIENT
Start: 2025-02-28

## 2025-02-28 NOTE — TELEPHONE ENCOUNTER
Received request via: Pharmacy    Was the patient seen in the last year in this department? Yes    Does the patient have an active prescription (recently filled or refills available) for medication(s) requested? No    [facility]    Does the patient have [facility] and need 100-day supply? (This applies to ALL medications) Patient does not have SCP      Pharmacy comment: REQUEST FOR 90 DAYS PRESCRIPTION.

## 2025-03-05 ENCOUNTER — HOSPITAL ENCOUNTER (OUTPATIENT)
Dept: LAB | Facility: MEDICAL CENTER | Age: 45
End: 2025-03-05
Attending: STUDENT IN AN ORGANIZED HEALTH CARE EDUCATION/TRAINING PROGRAM
Payer: COMMERCIAL

## 2025-03-05 DIAGNOSIS — Z31.69 INFERTILITY COUNSELING: ICD-10-CM

## 2025-03-05 LAB
ESTRADIOL SERPL-MCNC: 122 PG/ML
FSH SERPL-ACNC: 2.9 MIU/ML
LH SERPL-ACNC: 2.7 IU/L
PROGEST SERPL-MCNC: 8.3 NG/ML
TSH SERPL DL<=0.005 MIU/L-ACNC: 1.13 UIU/ML (ref 0.38–5.33)

## 2025-03-05 PROCEDURE — 84144 ASSAY OF PROGESTERONE: CPT

## 2025-03-05 PROCEDURE — 83002 ASSAY OF GONADOTROPIN (LH): CPT

## 2025-03-05 PROCEDURE — 84443 ASSAY THYROID STIM HORMONE: CPT

## 2025-03-05 PROCEDURE — 36415 COLL VENOUS BLD VENIPUNCTURE: CPT

## 2025-03-05 PROCEDURE — 82670 ASSAY OF TOTAL ESTRADIOL: CPT

## 2025-03-05 PROCEDURE — 83001 ASSAY OF GONADOTROPIN (FSH): CPT

## 2025-03-05 PROCEDURE — 82166 ASSAY ANTI-MULLERIAN HORM: CPT

## 2025-03-06 ENCOUNTER — PATIENT MESSAGE (OUTPATIENT)
Dept: OBGYN | Facility: CLINIC | Age: 45
End: 2025-03-06
Payer: COMMERCIAL

## 2025-03-10 LAB — MIS SERPL-MCNC: 0.23 NG/ML

## 2025-03-12 DIAGNOSIS — Z31.69 INFERTILITY COUNSELING: ICD-10-CM

## 2025-03-14 ENCOUNTER — APPOINTMENT (OUTPATIENT)
Dept: MEDICAL GROUP | Facility: PHYSICIAN GROUP | Age: 45
End: 2025-03-14
Payer: COMMERCIAL

## 2025-03-14 VITALS
SYSTOLIC BLOOD PRESSURE: 110 MMHG | WEIGHT: 155.6 LBS | TEMPERATURE: 97.4 F | OXYGEN SATURATION: 100 % | DIASTOLIC BLOOD PRESSURE: 60 MMHG | HEIGHT: 59 IN | HEART RATE: 64 BPM | BODY MASS INDEX: 31.37 KG/M2

## 2025-03-14 DIAGNOSIS — Z02.89 ENCOUNTER FOR COMPLETION OF FORM WITH PATIENT: ICD-10-CM

## 2025-03-14 DIAGNOSIS — F41.1 GENERALIZED ANXIETY DISORDER: Primary | ICD-10-CM

## 2025-03-14 DIAGNOSIS — Z23 NEED FOR VACCINATION: ICD-10-CM

## 2025-03-14 DIAGNOSIS — F33.2 SEVERE EPISODE OF RECURRENT MAJOR DEPRESSIVE DISORDER, WITHOUT PSYCHOTIC FEATURES (HCC): ICD-10-CM

## 2025-03-14 PROCEDURE — 90746 HEPB VACCINE 3 DOSE ADULT IM: CPT | Performed by: FAMILY MEDICINE

## 2025-03-14 PROCEDURE — 90471 IMMUNIZATION ADMIN: CPT | Performed by: FAMILY MEDICINE

## 2025-03-14 PROCEDURE — 3078F DIAST BP <80 MM HG: CPT | Performed by: FAMILY MEDICINE

## 2025-03-14 PROCEDURE — 3074F SYST BP LT 130 MM HG: CPT | Performed by: FAMILY MEDICINE

## 2025-03-14 PROCEDURE — 99214 OFFICE O/P EST MOD 30 MIN: CPT | Mod: 25 | Performed by: FAMILY MEDICINE

## 2025-03-14 ASSESSMENT — FIBROSIS 4 INDEX: FIB4 SCORE: 0.71

## 2025-03-14 NOTE — PROGRESS NOTES
"Verbal consent was acquired by the patient to use [employer] ambient listening note generation during this visit     Subjective:     HPI:   History of Present Illness  The patient presents for evaluation of anxiety and depression.    Anxiety  - She has been experiencing severe, constant anxiety, which she perceives as more debilitating than her depression.  - Her sleep is disrupted, with only 3 to 4 hours of rest per night.  - She reports difficulty with concentration and occasional speech issues.  - Memory lapses are also noted.    Depression  - She reports a slight improvement in her symptoms with the increased dosage of Lexapro 20 mg, which was increased during her last visit on 02/14/2025.    Supplemental information: She does not experience any physical limitations such as walking, standing, bending, or reaching. Personal hygiene tasks such as bathing and using the restroom are not problematic. She reports no recent episodes of chest pain, respiratory distress, fever, or chills. Her current medication regimen includes Lexapro 20 mg. She has an upcoming appointment for balance therapy scheduled on 03/20/2025.    MEDICATIONS  Current: Lexapro    IMMUNIZATIONS  She is due for her second dose of the hepatitis B vaccine.    Health Maintenance: Completed    Objective:     Exam:  /60 (BP Location: Right arm, Patient Position: Sitting, BP Cuff Size: Adult)   Pulse 64   Temp 36.3 °C (97.4 °F) (Temporal)   Ht 1.499 m (4' 11\")   Wt 70.6 kg (155 lb 9.6 oz)   LMP 02/13/2025 (Exact Date)   SpO2 100%   BMI 31.43 kg/m²  Body mass index is 31.43 kg/m².    Physical Exam  Constitutional:       Appearance: Normal appearance.   Cardiovascular:      Rate and Rhythm: Normal rate and regular rhythm.      Heart sounds: Normal heart sounds.   Pulmonary:      Effort: Pulmonary effort is normal.      Breath sounds: Normal breath sounds.   Musculoskeletal:      Cervical back: Normal range of motion and neck supple. "   Lymphadenopathy:      Cervical: No cervical adenopathy.   Neurological:      Mental Status: She is alert.             Results      Assessment & Plan:     1. Generalized anxiety disorder        2. Severe episode of recurrent major depressive disorder, without psychotic features (HCC)        3. Encounter for completion of form with patient        4. Need for vaccination  Hep B Adult 20+          Assessment & Plan  1. Anxiety: Chronic and currently uncontrolled. Severe and constant anxiety with some slight improvement since increasing Lexapro to 20 mg daily. Short-term disability paperwork was completed, scanned into the chart, and provided back.   - Continue Lexapro 20 mg daily  - Awaiting behavioral therapy scheduled for 03/20/2025  - Advised to avoid operating high-speed machinery    2. Depression: Chronic and currently uncontrolled. Some improvement with the increased dose of Lexapro. Severe and constant symptoms. Short-term disability paperwork was completed, scanned into the chart, and provided back. Advised not to operate high-speed machinery due to severe anxiety.  - Continue Lexapro 20 mg daily  - Awaiting behavioral therapy scheduled for 02/20/2025  - Advised to avoid operating high-speed machinery    3. Health maintenance.  - Administer second dose of hepatitis B vaccine today    Referral for genetic research was offered. Patient is a participant.      Return if symptoms worsen or fail to improve.    Please note that this dictation was created using voice recognition software. I have made every reasonable attempt to correct obvious errors, but I expect that there are errors of grammar and possibly content that I did not discover before finalizing the note.

## 2025-03-17 PROBLEM — M54.12 CERVICAL RADICULOPATHY: Status: ACTIVE | Noted: 2025-03-17

## 2025-03-18 ENCOUNTER — HOSPITAL ENCOUNTER (OUTPATIENT)
Dept: RADIOLOGY | Facility: MEDICAL CENTER | Age: 45
End: 2025-03-18
Attending: STUDENT IN AN ORGANIZED HEALTH CARE EDUCATION/TRAINING PROGRAM
Payer: COMMERCIAL

## 2025-03-18 ENCOUNTER — HOSPITAL ENCOUNTER (OUTPATIENT)
Dept: LAB | Facility: MEDICAL CENTER | Age: 45
End: 2025-03-18
Attending: STUDENT IN AN ORGANIZED HEALTH CARE EDUCATION/TRAINING PROGRAM
Payer: COMMERCIAL

## 2025-03-18 DIAGNOSIS — Z31.69 INFERTILITY COUNSELING: ICD-10-CM

## 2025-03-18 LAB — HCG SERPL QL: NEGATIVE

## 2025-03-18 PROCEDURE — 84703 CHORIONIC GONADOTROPIN ASSAY: CPT

## 2025-03-18 PROCEDURE — 36415 COLL VENOUS BLD VENIPUNCTURE: CPT

## 2025-03-20 ENCOUNTER — APPOINTMENT (OUTPATIENT)
Dept: BEHAVIORAL HEALTH | Facility: CLINIC | Age: 45
End: 2025-03-20
Payer: COMMERCIAL

## 2025-03-20 DIAGNOSIS — F41.1 GAD (GENERALIZED ANXIETY DISORDER): ICD-10-CM

## 2025-03-20 DIAGNOSIS — F33.2 SEVERE EPISODE OF RECURRENT MAJOR DEPRESSIVE DISORDER, WITHOUT PSYCHOTIC FEATURES (HCC): ICD-10-CM

## 2025-03-20 PROCEDURE — 99204 OFFICE O/P NEW MOD 45 MIN: CPT | Performed by: PSYCHIATRY & NEUROLOGY

## 2025-03-20 PROCEDURE — 96127 BRIEF EMOTIONAL/BEHAV ASSMT: CPT | Performed by: PSYCHIATRY & NEUROLOGY

## 2025-03-20 RX ORDER — SERTRALINE HYDROCHLORIDE 100 MG/1
TABLET, FILM COATED ORAL
Qty: 55 TABLET | Refills: 0 | Status: SHIPPED | OUTPATIENT
Start: 2025-03-20 | End: 2025-04-29

## 2025-03-20 RX ORDER — BUSPIRONE HYDROCHLORIDE 15 MG/1
15 TABLET ORAL 2 TIMES DAILY
Qty: 60 TABLET | Refills: 1 | Status: SHIPPED | OUTPATIENT
Start: 2025-03-20

## 2025-03-20 ASSESSMENT — ANXIETY QUESTIONNAIRES
IF YOU CHECKED OFF ANY PROBLEMS ON THIS QUESTIONNAIRE, HOW DIFFICULT HAVE THESE PROBLEMS MADE IT FOR YOU TO DO YOUR WORK, TAKE CARE OF THINGS AT HOME, OR GET ALONG WITH OTHER PEOPLE: VERY DIFFICULT
7. FEELING AFRAID AS IF SOMETHING AWFUL MIGHT HAPPEN: MORE THAN HALF THE DAYS
2. NOT BEING ABLE TO STOP OR CONTROL WORRYING: NEARLY EVERY DAY
6. BECOMING EASILY ANNOYED OR IRRITABLE: SEVERAL DAYS
4. TROUBLE RELAXING: MORE THAN HALF THE DAYS
5. BEING SO RESTLESS THAT IT IS HARD TO SIT STILL: SEVERAL DAYS
1. FEELING NERVOUS, ANXIOUS, OR ON EDGE: MORE THAN HALF THE DAYS
GAD7 TOTAL SCORE: 14
3. WORRYING TOO MUCH ABOUT DIFFERENT THINGS: NEARLY EVERY DAY

## 2025-03-20 ASSESSMENT — PATIENT HEALTH QUESTIONNAIRE - PHQ9
SUM OF ALL RESPONSES TO PHQ QUESTIONS 1-9: 21
5. POOR APPETITE OR OVEREATING: 3 - NEARLY EVERY DAY
CLINICAL INTERPRETATION OF PHQ2 SCORE: 6

## 2025-03-20 NOTE — PROGRESS NOTES
"    INITIAL PSYCHIATRIC EVALUATION      This provider informed the patient their medical records are totally confidential except for the use by other providers involved in their care, or if the patient signs a release, or to report instances of child or elder abuse, or if it is determined they are an immediate risk to harm themselves or others.      CHIEF COMPLAINT  \"Need help with anxiety and depression\"      HISTORY OF PRESENT ILLNESS  Nav Coates is a 44 y.o. old female comes in today to establish care and for evaluation of depression and anxiety.  I did reviewed all outpatient psychiatry follow up notes over last 3 years. Patient is new to the clinic.     History of Present Illness    She has a long-standing history of these conditions, dating back to her childhood, which was spent in foster care due to her mother's schizophrenia and depression.   She experiences both depression and anxiety, with the latter being more pronounced at present.   Her anxiety is a daily occurrence, manifesting as chest pain, panic attacks, and a need for frequent urination and bowel movements.   She reports no history of hypertension, diabetes, seizures, or head injury.   She also reports feelings of anger, which she attempts to suppress.   She does not report any episodes of heightened mood lasting 3 to 5 days.   She has undergone testing for autism, scoring in the 99th percentile.   She occasionally experiences auditory hallucinations of hearing own voice, particularly during periods of stress. She also reports dissociative episodes under stress.   During her teenage years, she exhibited promiscuous behavior as a coping mechanism for stress, which led to disciplinary issues. She has since developed alternative coping strategies and has sought life coaching.   She finds meditation challenging due to her overwhelmed state of mind.   She often experiences nightmares related to her traumatic childhood.   She is uncertain whether " her poor sleep is primarily due to nightmares or anxiety.   She has previously tried Klonopin, which initially provided relief but became less effective over time.   She has also tried Prozac and Celexa, both of which were discontinued due to adverse effects.   She has previously engaged in talk therapy but found the sessions too short to be beneficial. Her last session was in 2017 or 2018.   She is currently on Lexapro, which has been slightly beneficial, but her symptoms remain severe. Her sleep is limited to 3 to 4 hours per night, resulting in fatigue the following day. Her appetite fluctuates, leading to significant weight changes. She reports a lack of motivation and drive.  After reviewing risk and benefit agreed with considering Zoloft and BuSpar.  Patient not interested in adding more medication for sleep and will try melatonin over-the-counter as needed for sleep.  Motivated to consider psychotherapy with focus on EMDR.    FAMILY HISTORY  Her mother had schizophrenia and depression.  Her son has bipolar disorder.      PSYCHIATRIC REVIEW OF SYSTEMS: denies manic symptoms, see HPI for depressive symptoms, and see HPI for anxeity symptoms      MEDICAL REVIEW OF SYSTEMS:   Constitutional negative   Eyes negative   Ears/Nose/Mouth/Throat negative   Cardiovascular  HLD   Respiratory negative   Gastrointestinal negative    Genitourinary negative   Muscular  Chronic back pain   Integumentary negative   Neurological negative   Endocrine negative   Hematologic/Lymphatic negative     CURRENT MEDICATIONS:  Current Outpatient Medications   Medication Sig Dispense Refill    escitalopram (LEXAPRO) 10 MG Tab Take 2 Tablets by mouth every day. 180 Tablet 0    propranolol (INDERAL) 10 MG Tab Take 1 Tablet by mouth 3 times a day as needed (anxiety attack). 90 Tablet 0    albuterol 108 (90 Base) MCG/ACT Aero Soln inhalation aerosol Inhale 2 Puffs every four hours as needed for Shortness of Breath. 25.5 Each 0    ADVAIR DISKUS  250-50 MCG/DOSE AEROSOL POWDER, BREATH ACTIVATED TAKE 1 PUFF BY MOUTH EVERY 12 HOURS 60 Each 3    albuterol (PROVENTIL) 2.5mg/3ml Nebu Soln solution for nebulization 3 mL by Nebulization route every four hours as needed for Shortness of Breath. 30 Bullet 2     No current facility-administered medications for this visit.       ALLERGIES:  Patient has no known allergies.      PAST PSYCHIATRIC MEDICATIONS  Prozac, Celexa, Lexapro  Propranolol, Klonopin     MEDICAL HISTORY  No past medical history on file.  Past Surgical History:   Procedure Laterality Date    CARPAL TUNNEL RELEASE Bilateral     SALPINGOSTOMY  2006    ectopic pregnancy    PRIMARY C SECTION Bilateral          PHYSICAL EXAMINAION:  Vital signs: LMP 2025 (Exact Date)   Musculoskeletal: Normal gait.   Abnormal movements: none    MENTAL STATUS EXAMINATION      General:   - Grooming and hygiene: Casual,   - Apparent distress: tense,   - Behavior: Tense  - Eye Contact:  Good,   - no psychomotor agitation or retardation    - Participation: Active verbal participation  Orientation: Alert and Fully Oriented to person, place and time  Mood: Depressed and Anxious  Affect: Constricted,  Thought Process: Logical and Goal-directed  Thought Content: Denies suicidal or homicidal ideations, intent or plan   Perception: Denies auditory or visual hallucinations. No delusions noted   Attention span and concentration: fair   Speech:Rate within normal limits and Volume within normal limits  Language: Appropriate   Insight: Good  Judgment: Good  Recent and remote memory: No gross evidence of memory deficits      DEPRESSION SCREENIN/15/2024    11:20 AM 2025    10:20 AM 2025     9:00 AM   Depression Screen (PHQ-2/PHQ-9)   PHQ-2 Total Score 4 6 6   PHQ-9 Total Score 15 16 25       Interpretation of PHQ-9 Total Score   Score Severity   1-4 No Depression   5-9 Mild Depression   10-14 Moderate Depression   15-19 Moderately Severe  Depression   20-27 Severe Depression      SAFETY ASSESSMENT - SELF:    Does patient acknowledge current or past symptoms of dangerousness to self? no  History of suicide by family member: no  History of suicide by friend/significant other: no  Recent change in amount/specificity/intensity of suicidal thoughts or self-harm behavior? no  Current access to firearms, medications, or other identified means of suicide/self-harm? no  Protective factors present: family, kid       SAFETY ASSESSMENT - OTHERS:    Does patient acknowledge current or past symptoms of aggressive behavior or risk to others? no  Recent change in amount/specificity/intensity of thoughts or threats to harm others? no  Current access to firearms/other identified means of harm? no       CURRENT RISK:       Suicidal: Low       Homicidal: Low       Self-Harm: Low       Relapse: Low       Crisis Safety Plan Reviewed Not Indicated    MEDICAL RECORDS/LABS/DIAGNOSTIC TESTS REVIEWED:  Component      Latest Ref Rng 3/5/2025   TSH      0.380 - 5.330 uIU/mL 1.130            [place]  records -   Reviewed      ASSESSMENT PLAN:    (1) MDD; (2) NANCY; (3) PTSD  PHQ9: 21; GAD7: 14  Taper Lexapro to 10 mg daily X 10 days and stop.  Add Zoloft 100 mg daily X 10 days --> 150 mg daily.  Add Buspar 15 mg BID.  Add Melatonin HS PRN for sleep.  Consider psychotherapy (EMDR based).  Medication options, alternatives (including no medications) and medication risks/benefits/side effects were discussed in detail.  Explained importance of contraceptive measures while on psychotropic medications, educated to let provider know if ever pregnant or wanting to become pregnant. Verbalized understanding.  The patient was advised to call, message provider on MyChart, or come in to the clinic if symptoms worsen or if any future questions/issues regarding their medications arise; the patient verbalized understanding and agreement.  The patient was educated to call 911, call the suicide  hotline, or go to local ER if having thoughts of suicide or homicide; verbalized understanding.      Return to clinic in 5 weeks or sooner if symptoms worsen.  Next Appointment:  instruction provided on how to make the next appointment.     The proposed treatment plan was discussed with the patient who was provided the opportunity to ask questions and make suggestions regarding alternative treatment. Patient verbalized understanding and expressed agreement with the plan.     Thank you for allowing me to participate in the care of this patient.    Brant Bashir M.D.  03/20/25    CC:   Sarah Ramírez M.D.    This note was created using voice recognition software ([employer]agon). The accuracy of the dictation is limited by the abilities of the software. I have reviewed the note prior to signing, however some errors in grammar and context are still possible. If you have any questions related to this note please do not hesitate to contact our office.

## 2025-03-28 ENCOUNTER — APPOINTMENT (OUTPATIENT)
Dept: MEDICAL GROUP | Facility: PHYSICIAN GROUP | Age: 45
End: 2025-03-28
Payer: COMMERCIAL

## 2025-04-03 DIAGNOSIS — F41.1 GAD (GENERALIZED ANXIETY DISORDER): ICD-10-CM

## 2025-04-03 DIAGNOSIS — F33.2 SEVERE EPISODE OF RECURRENT MAJOR DEPRESSIVE DISORDER, WITHOUT PSYCHOTIC FEATURES (HCC): ICD-10-CM

## 2025-04-04 RX ORDER — BUSPIRONE HYDROCHLORIDE 15 MG/1
15 TABLET ORAL 2 TIMES DAILY
Qty: 180 TABLET | Refills: 1 | Status: SHIPPED | OUTPATIENT
Start: 2025-04-04 | End: 2025-04-24 | Stop reason: SDUPTHER

## 2025-04-04 RX ORDER — SERTRALINE HYDROCHLORIDE 100 MG/1
150 TABLET, FILM COATED ORAL DAILY
Qty: 135 TABLET | Refills: 1 | Status: SHIPPED | OUTPATIENT
Start: 2025-04-04 | End: 2025-04-24 | Stop reason: SDUPTHER

## 2025-04-24 ENCOUNTER — TELEMEDICINE (OUTPATIENT)
Dept: BEHAVIORAL HEALTH | Facility: CLINIC | Age: 45
End: 2025-04-24
Payer: COMMERCIAL

## 2025-04-24 DIAGNOSIS — F33.2 SEVERE EPISODE OF RECURRENT MAJOR DEPRESSIVE DISORDER, WITHOUT PSYCHOTIC FEATURES (HCC): ICD-10-CM

## 2025-04-24 DIAGNOSIS — F41.1 GAD (GENERALIZED ANXIETY DISORDER): ICD-10-CM

## 2025-04-24 PROCEDURE — 99214 OFFICE O/P EST MOD 30 MIN: CPT | Mod: 95 | Performed by: PSYCHIATRY & NEUROLOGY

## 2025-04-24 RX ORDER — SERTRALINE HYDROCHLORIDE 100 MG/1
150 TABLET, FILM COATED ORAL DAILY
Qty: 135 TABLET | Refills: 1 | Status: SHIPPED | OUTPATIENT
Start: 2025-04-24 | End: 2025-07-23

## 2025-04-24 RX ORDER — BUSPIRONE HYDROCHLORIDE 15 MG/1
15 TABLET ORAL 2 TIMES DAILY
Qty: 180 TABLET | Refills: 1 | Status: SHIPPED | OUTPATIENT
Start: 2025-04-24

## 2025-04-24 NOTE — PROGRESS NOTES
"This evaluation was conducted via Teams using secure and encrypted videoconferencing technology. The patient was in their home in the [facility].    The patient's identity was confirmed and verbal consent was obtained for this virtual visit.      PSYCHIATRY FOLLOW-UP NOTE      Name: Nav Coates  MRN: [id number]  : 1980  Age: 45 y.o.  Date of assessment: 2025  PCP: Sarah Ramírez M.D.  Persons in attendance: Patient    \"She prefer Jeanette\"    REASON FOR VISIT/CHIEF COMPLAINT (as stated by Patient):  Nav Coates is a 45 y.o., Black or  female, attending follow-up appointment for mood and anxiety management.      HISTORY OF PRESENT ILLNESS:  Nav Coates is a 45 y.o. old female with MDD, NANCY and PTSD comes in today for follow up. Patient was last seen 1 month ago, and following treatment planning recommendations were done:  Taper Lexapro to 10 mg daily X 10 days and stop.  Add Zoloft 100 mg daily X 10 days --> 150 mg daily.  Add Buspar 15 mg BID.  Add Melatonin HS PRN for sleep.  Consider psychotherapy (EMDR based).    History of Present Illness    The chief complaint is anxiety and depression.  BuSpar has been administered twice daily for the past week.   Additionally, Zoloft has been taken once daily for approximately 1.5 weeks  A slight improvement in mood and anxiety symptoms is reported, with a decrease in daily crying episodes. However, anxiety and sleep disturbances persist.   Melatonin has been attempted to manage sleep issues, but it has not resulted in prolonged sleep duration.   Nausea is experienced, which is attributed to the morning dose of Zoloft.   Concern about potential sleep aids is noted due to an early morning work schedule and the associated anxiety of not being able to wake up on time. She prefers to stay with melatonin.  An upcoming appointment for psychotherapy is scheduled for 2025.     Agreed with continuing Zoloft at same dose of 100 mg " daily dose for 1 more week and then increasing the dose to total 150 mg and keeping BuSpar at same dose.  No signs of serotonin syndrome noted.  Discussed how Zoloft is first line and with time we can even reduce BuSpar if indicated.      CURRENT MEDICATIONS:  Current Outpatient Medications   Medication Sig Dispense Refill    sertraline (ZOLOFT) 100 MG Tab Take 1.5 Tablets by mouth every day for 90 days. 135 Tablet 1    busPIRone (BUSPAR) 15 MG tablet TAKE 1 TABLET BY MOUTH TWICE A  Tablet 1    propranolol (INDERAL) 10 MG Tab Take 1 Tablet by mouth 3 times a day as needed (anxiety attack). 90 Tablet 0    albuterol 108 (90 Base) MCG/ACT Aero Soln inhalation aerosol Inhale 2 Puffs every four hours as needed for Shortness of Breath. 25.5 Each 0    ADVAIR DISKUS 250-50 MCG/DOSE AEROSOL POWDER, BREATH ACTIVATED TAKE 1 PUFF BY MOUTH EVERY 12 HOURS 60 Each 3    albuterol (PROVENTIL) 2.5mg/3ml Nebu Soln solution for nebulization 3 mL by Nebulization route every four hours as needed for Shortness of Breath. 30 Bullet 2     No current facility-administered medications for this visit.       MEDICAL HISTORY  No past medical history on file.  Past Surgical History:   Procedure Laterality Date    CARPAL TUNNEL RELEASE Bilateral 2019 September, October    SALPINGOSTOMY  2006    ectopic pregnancy    PRIMARY C SECTION Bilateral        PAST PSYCHIATRIC MEDICATIONS  Prozac, Zoloft, Celexa, Lexapro  Buspar, Propranolol, Klonopin     REVIEW OF SYSTEMS:        Constitutional negative   Eyes negative   Ears/Nose/Mouth/Throat negative   Cardiovascular  HLD   Respiratory negative   Gastrointestinal negative    Genitourinary negative   Muscular  Chronic back pain   Integumentary negative   Neurological negative   Endocrine negative   Hematologic/Lymphatic negative     PHYSICAL EXAMINAION:  Vital signs: There were no vitals taken for this visit.  Musculoskeletal: Normal gait.   Abnormal movements: none      MENTAL STATUS EXAMINATION       General:   - Grooming and hygiene: Casual,   - Apparent distress: tense,   - Behavior: Tense  - Eye Contact:  Good,   - no psychomotor agitation or retardation    - Participation: Active verbal participation  Orientation: Alert and Fully Oriented to person, place and time  Mood: Depressed and Anxious  Affect: Constricted,  Thought Process: Logical and Goal-directed  Thought Content: Denies suicidal or homicidal ideations, intent or plan   Perception: Denies auditory or visual hallucinations. No delusions noted   Attention span and concentration: Intact   Speech:Rate within normal limits and Volume within normal limits  Language: Appropriate   Insight: Good  Judgment: Good  Recent and remote memory: No gross evidence of memory deficits        DEPRESSION SCREENIN/2/2025    10:20 AM 2025     9:00 AM 3/20/2025     2:00 PM   Depression Screen (PHQ-2/PHQ-9)   PHQ-2 Total Score 6 6 6   PHQ-9 Total Score 16 25 21       Interpretation of PHQ-9 Total Score   Score Severity   1-4 No Depression   5-9 Mild Depression   10-14 Moderate Depression   15-19 Moderately Severe Depression   20-27 Severe Depression    CURRENT RISK:       Suicidal: Low       Homicidal: Low       Self-Harm: Low       Relapse: Low       Crisis Safety Plan Reviewed Not Indicated       If evidence of imminent risk is present, intervention/plan:      MEDICAL RECORDS/LABS/DIAGNOSTIC TESTS REVIEWED:  No new lab since last visit     [place]  records -   Reviewed     (1) MDD; (2) NANCY; (3) PTSD  Not improving  Continue Zoloft 100 mg daily X 1 more week --> 150 mg daily  Continue Buspar 15 mg BID  Continue Melatonin HS PRN for sleep.  Consider psychotherapy (EMDR based).  Medication options, alternatives (including no medications) and medication risks/benefits/side effects were discussed in detail.  Explained importance of contraceptive measures while on psychotropic medications, educated to let provider know if ever pregnant or wanting to become  pregnant. Verbalized understanding.  The patient was advised to call, message provider on MyChart, or come in to the clinic if symptoms worsen or if any future questions/issues regarding their medications arise; the patient verbalized understanding and agreement.  The patient was educated to call 911, call the suicide hotline, or go to local ER if having thoughts of suicide or homicide; verbalized understanding.      Billing Coding based on:  [address]4620 based on MDM    Return to clinic in 1 month or sooner if symptoms worsen.  Next Appointment: instruction provided on how to make the next appointment.     The proposed treatment plan was discussed with the patient who was provided the opportunity to ask questions and make suggestions regarding alternative treatment. Patient verbalized understanding and expressed agreement with the plan.       Brant Bashir M.D.  04/24/25    This note was created using voice recognition software (Dragon). The accuracy of the dictation is limited by the abilities of the software. I have reviewed the note prior to signing, however some errors in grammar and context are still possible. If you have any questions related to this note please do not hesitate to contact our office.

## 2025-05-02 ENCOUNTER — APPOINTMENT (OUTPATIENT)
Dept: MEDICAL GROUP | Facility: PHYSICIAN GROUP | Age: 45
End: 2025-05-02
Payer: COMMERCIAL

## 2025-05-02 VITALS
OXYGEN SATURATION: 98 % | HEIGHT: 59 IN | WEIGHT: 160.2 LBS | DIASTOLIC BLOOD PRESSURE: 68 MMHG | TEMPERATURE: 98.5 F | BODY MASS INDEX: 32.3 KG/M2 | SYSTOLIC BLOOD PRESSURE: 104 MMHG | HEART RATE: 73 BPM

## 2025-05-02 DIAGNOSIS — F41.1 GAD (GENERALIZED ANXIETY DISORDER): ICD-10-CM

## 2025-05-02 PROCEDURE — 3074F SYST BP LT 130 MM HG: CPT | Performed by: STUDENT IN AN ORGANIZED HEALTH CARE EDUCATION/TRAINING PROGRAM

## 2025-05-02 PROCEDURE — 3078F DIAST BP <80 MM HG: CPT | Performed by: STUDENT IN AN ORGANIZED HEALTH CARE EDUCATION/TRAINING PROGRAM

## 2025-05-02 PROCEDURE — 99213 OFFICE O/P EST LOW 20 MIN: CPT | Performed by: STUDENT IN AN ORGANIZED HEALTH CARE EDUCATION/TRAINING PROGRAM

## 2025-05-02 ASSESSMENT — FIBROSIS 4 INDEX: FIB4 SCORE: 0.73

## 2025-05-02 ASSESSMENT — ENCOUNTER SYMPTOMS
DIZZINESS: 0
FEVER: 0
CHILLS: 0
SHORTNESS OF BREATH: 0
HEADACHES: 0

## 2025-05-02 NOTE — PROGRESS NOTES
"Subjective:     CC:   Chief Complaint   Patient presents with    Paperwork       HPI:   Nav presents today to complete paperwork to return to work. She has been on a leave of absence from work due to severe anxiety, which is under better control now. She is under the care of a psychiatrist for treatment. She is on a regimen of Zoloft 150 mg daily and Buspar 15 mg twice daily. She works at [employer]. She continues to have difficulty with high-speed machinery because it significantly exacerbates her anxiety, she has disability paperwork to this effect. She feels ready to return to work with no other restrictions. She has no physical limitations.    Past medical, surgical, family, and social history were reviewed and updated.     Medications:    Current Outpatient Medications:     busPIRone (BUSPAR) 15 MG tablet, Take 1 Tablet by mouth 2 times a day., Disp: 180 Tablet, Rfl: 1    sertraline (ZOLOFT) 100 MG Tab, Take 1.5 Tablets by mouth every day for 90 days., Disp: 135 Tablet, Rfl: 1    albuterol 108 (90 Base) MCG/ACT Aero Soln inhalation aerosol, Inhale 2 Puffs every four hours as needed for Shortness of Breath., Disp: 25.5 Each, Rfl: 0    ADVAIR DISKUS 250-50 MCG/DOSE AEROSOL POWDER, BREATH ACTIVATED, TAKE 1 PUFF BY MOUTH EVERY 12 HOURS, Disp: 60 Each, Rfl: 3    albuterol (PROVENTIL) 2.5mg/3ml Nebu Soln solution for nebulization, 3 mL by Nebulization route every four hours as needed for Shortness of Breath., Disp: 30 Bullet, Rfl: 2    Allergies:  Patient has no known allergies.      Health Maintenance: Completed    ROS:  Review of Systems   Constitutional:  Negative for chills and fever.   Respiratory:  Negative for shortness of breath.    Cardiovascular:  Negative for chest pain.   Neurological:  Negative for dizziness and headaches.       Objective:     Exam:  /68 (BP Location: Left arm, Patient Position: Sitting, BP Cuff Size: Adult)   Pulse 73   Temp 36.9 °C (98.5 °F) (Temporal)   Ht 1.499 m (4' 11\")  "  Wt 72.7 kg (160 lb 3.2 oz)   SpO2 98%   BMI 32.36 kg/m²  Body mass index is 32.36 kg/m².    Physical Exam  Constitutional:       General: She is not in acute distress.  HENT:      Mouth/Throat:      Mouth: Mucous membranes are moist.   Eyes:      Extraocular Movements: Extraocular movements intact.   Pulmonary:      Effort: Pulmonary effort is normal. No respiratory distress.   Musculoskeletal:      Cervical back: Neck supple.   Skin:     Comments: No visible rashes   Neurological:      Mental Status: She is alert.   Psychiatric:         Mood and Affect: Mood normal.           Assessment & Plan:     45 y.o. female with the following -     1. NANCY (generalized anxiety disorder)  Chronic, stable condition. Improvement on current regimen. Followed by psychiatry. Establishing with therapy in July. Feels ready to return to work on 5/12/24 as planned. She should continue to avoid high-speed machinery due to her anxiety at work, otherwise may return without restrictions. Continue to follow up with psychiatry and PCP for management of anxiety.  - Return to work form completed for patient and scanned into chart          Return if symptoms worsen or fail to improve.    Please note that this dictation was created using voice recognition software. I have made every reasonable attempt to correct obvious errors, but I expect that there are errors of grammar and possibly content that I did not discover before finalizing the note.

## 2025-05-14 PROBLEM — G56.01 CARPAL TUNNEL SYNDROME OF RIGHT WRIST: Status: ACTIVE | Noted: 2025-05-14

## 2025-05-23 ENCOUNTER — APPOINTMENT (OUTPATIENT)
Dept: MEDICAL GROUP | Facility: PHYSICIAN GROUP | Age: 45
End: 2025-05-23
Payer: COMMERCIAL

## 2025-07-14 ENCOUNTER — OFFICE VISIT (OUTPATIENT)
Dept: MEDICAL GROUP | Facility: PHYSICIAN GROUP | Age: 45
End: 2025-07-14
Payer: COMMERCIAL

## 2025-07-14 VITALS
BODY MASS INDEX: 32.94 KG/M2 | OXYGEN SATURATION: 93 % | TEMPERATURE: 98.6 F | WEIGHT: 163.4 LBS | HEIGHT: 59 IN | HEART RATE: 63 BPM | DIASTOLIC BLOOD PRESSURE: 62 MMHG | SYSTOLIC BLOOD PRESSURE: 110 MMHG

## 2025-07-14 DIAGNOSIS — Z23 NEED FOR VACCINATION: ICD-10-CM

## 2025-07-14 DIAGNOSIS — Z77.122 EXPOSURE TO NOISE: ICD-10-CM

## 2025-07-14 DIAGNOSIS — I83.813 VARICOSE VEINS OF BILATERAL LOWER EXTREMITIES WITH PAIN: ICD-10-CM

## 2025-07-14 DIAGNOSIS — F33.2 SEVERE EPISODE OF RECURRENT MAJOR DEPRESSIVE DISORDER, WITHOUT PSYCHOTIC FEATURES (HCC): Primary | ICD-10-CM

## 2025-07-14 DIAGNOSIS — F41.1 GAD (GENERALIZED ANXIETY DISORDER): ICD-10-CM

## 2025-07-14 PROCEDURE — 3078F DIAST BP <80 MM HG: CPT | Performed by: FAMILY MEDICINE

## 2025-07-14 PROCEDURE — 90471 IMMUNIZATION ADMIN: CPT | Performed by: FAMILY MEDICINE

## 2025-07-14 PROCEDURE — 90746 HEPB VACCINE 3 DOSE ADULT IM: CPT | Mod: JZ | Performed by: FAMILY MEDICINE

## 2025-07-14 PROCEDURE — 3074F SYST BP LT 130 MM HG: CPT | Performed by: FAMILY MEDICINE

## 2025-07-14 PROCEDURE — 99214 OFFICE O/P EST MOD 30 MIN: CPT | Mod: 25 | Performed by: FAMILY MEDICINE

## 2025-07-14 RX ORDER — CITALOPRAM HYDROBROMIDE 10 MG/1
TABLET ORAL
Qty: 74 TABLET | Refills: 0 | Status: SHIPPED | OUTPATIENT
Start: 2025-07-14 | End: 2025-07-21

## 2025-07-14 RX ORDER — SERTRALINE HYDROCHLORIDE 25 MG/1
TABLET, FILM COATED ORAL
Qty: 84 TABLET | Refills: 0 | Status: SHIPPED | OUTPATIENT
Start: 2025-07-14 | End: 2025-07-22

## 2025-07-14 ASSESSMENT — FIBROSIS 4 INDEX: FIB4 SCORE: 0.73

## 2025-07-14 NOTE — PATIENT INSTRUCTIONS
TO CHANGE FROM SERTRALINE TO CITALOPRAM  - take 1 full 100 mg sertraline pill once per day until prescription runs out  - Then you will take 3 25-mg tablets of sertraline (75 mg) once per day for 2 weeks. This is when you start taking 1 10-mg tablet of citalopram (10 mg) once per day.  - Then you will take 2 25-mg tablets of sertraline (50 mg) once per day for 2 weeks. Increase citalopram to 2 tablets (20 mg) once per day.  - Then you will take 1 25-mg tablet of sertraline (25 mg) once per day for 2 weeks. Stay on citalopram 2 tablets per day.  - THEN STOP SERTRALINE. Stay on citalopram 2 tablets per day.

## 2025-07-14 NOTE — LETTER
2025      Re: Le’Kathleen Irizarry Coates  : [birth year]    She does need to protect her hearing from noise exposure, however, ear plugs are causing her pain. She'll need to use over the ear noise reducing ear muffs to protect her hearing without any pain.    Thank you,    Sarah Ramírez M.D.

## 2025-07-14 NOTE — PROGRESS NOTES
Verbal consent was acquired by the patient to use [employer] ambient listening note generation during this visit     Subjective:     HPI:   History of Present Illness  The patient presents for evaluation of depression, varicose veins, and ear pain.    Depression  - She is considering a return to her previous medication due to weight gain, which she attributes to Zoloft.  - Despite this side effect, she believes Zoloft is effective in managing her depression.  - She has not yet scheduled an appointment with Dr. Bashir, who prescribes her medication.  - In the meantime, she has been attending [facility] for talk therapy.  - She was previously on citalopram from 2018 to 2021 and escitalopram from 02/2025 to 03/2025.  - She wishes to resume citalopram, as she felt well while on it.  - She discontinued citalopram when she started feeling better, but her depression symptoms resurfaced following work-related stress.  - She plans to continue the medication even if she feels well.  - Her current regimen includes 1.5 pills of sertraline daily, with a week's supply remaining.    Varicose Veins  - She has been experiencing varicose veins for some time, which have recently become painful and itchy, particularly behind her legs.  - This discomfort has been severe enough to prevent her from walking for a week.  - The pain is localized to the vein area, and she occasionally experiences leg swelling.  - Both legs are affected equally, with the pain sometimes more pronounced in one leg than the other.  - She reports no recent chest pain, breathing difficulties, fevers, or chills.    Ear Pain  - She has been using earplugs at work for over a year due to noise sensitivity but has recently developed inner ear pain.  - She is requesting a note for earmuffs as an alternative.    Occupations:  at [employer] Maintenance: Completed    Objective:     Exam:  /62 (BP Location: Left arm, Patient Position:  "Sitting, BP Cuff Size: Adult)   Pulse 63   Temp 37 °C (98.6 °F) (Temporal)   Ht 1.499 m (4' 11\")   Wt 74.1 kg (163 lb 6.4 oz)   SpO2 93%   BMI 33.00 kg/m²  Body mass index is 33 kg/m².    Physical Exam  Constitutional:       Appearance: Normal appearance.   Cardiovascular:      Rate and Rhythm: Normal rate and regular rhythm.      Heart sounds: Normal heart sounds.   Pulmonary:      Effort: Pulmonary effort is normal.      Breath sounds: Normal breath sounds.   Musculoskeletal:      Cervical back: Normal range of motion and neck supple.   Lymphadenopathy:      Cervical: No cervical adenopathy.   Neurological:      Mental Status: She is alert.             Results      Assessment & Plan:     1. Severe episode of recurrent major depressive disorder, without psychotic features (HCC)  sertraline (ZOLOFT) 25 MG tablet      2. NANCY (generalized anxiety disorder)  sertraline (ZOLOFT) 25 MG tablet      3. Varicose veins of bilateral lower extremities with pain  Referral to Vascular Surgery      4. Exposure to noise        5. Need for vaccination  Hepatitis B Vaccine Adult 20+          Assessment & Plan  1. Depression & Anxiety: Chronic. Her depression appears to be well-managed with sertraline, but she is reporting side effects of treatment. She wants to switch back to citalopram. She does not have any appointments scheduled with psychiatry.   - Treatment plan: A tapering plan for sertraline has been discussed and agreed upon. She will reduce her sertraline intake from 1.5 pills (150 mg) to 1 pill (100 mg) daily until the prescription is exhausted.  Then she will decrease to 75 mg of sertraline daily for 2 weeks.  Concurrently, she will start citalopram at a dose of 10 mg daily. Once the sertraline dosage is reduced to 50 mg, she will increase the citalopram dosage to 20 mg daily. The sertraline will then be gradually discontinued while maintaining the citalopram dosage. She is advised to schedule an appointment with " Dr. Bashir to discuss this change and monitor its effectiveness.    2. Varicose veins: Chronic, progressing.  She reports pain and itching in both legs, with occasional swelling. Physical exam findings indicate varicose veins in both lower extremities.  - Diagnostic plan: A referral to a vascular surgeon has been made for further evaluation and discussion of treatment options for her varicose veins.  - Treatment plan: If any additional information is required by the vascular surgeon, she will be notified via MyChart.    3. Ear pain: She reports inner ear discomfort from using earplugs at work.  She has to use ear protection due to noise exposure.  - Treatment plan: A letter has been provided for her employer regarding the use of over the ear protection instead of earplugs due to inner ear discomfort.    4. Health maintenance: She is due for her last hepatitis B vaccine dose and will receive it today.  - Treatment plan: A follow-up wellness visit is scheduled for 6 months from now.      Referral for genetic research was offered. Patient is a participant.    Return in about 6 months (around 1/14/2026) for Annual/wellness visit.    Please note that this dictation was created using voice recognition software. I have made every reasonable attempt to correct obvious errors, but I expect that there are errors of grammar and possibly content that I did not discover before finalizing the note.

## 2025-07-17 PROBLEM — M79.641 RIGHT HAND PAIN: Status: ACTIVE | Noted: 2025-07-17

## 2025-07-22 ENCOUNTER — TELEMEDICINE (OUTPATIENT)
Dept: BEHAVIORAL HEALTH | Facility: CLINIC | Age: 45
End: 2025-07-22
Payer: COMMERCIAL

## 2025-07-22 DIAGNOSIS — F41.1 GAD (GENERALIZED ANXIETY DISORDER): Primary | ICD-10-CM

## 2025-07-22 DIAGNOSIS — F33.2 SEVERE EPISODE OF RECURRENT MAJOR DEPRESSIVE DISORDER, WITHOUT PSYCHOTIC FEATURES (HCC): ICD-10-CM

## 2025-07-22 PROCEDURE — 99214 OFFICE O/P EST MOD 30 MIN: CPT | Mod: 95 | Performed by: PSYCHIATRY & NEUROLOGY

## 2025-07-22 RX ORDER — FLUOXETINE HYDROCHLORIDE 40 MG/1
40 CAPSULE ORAL DAILY
Qty: 90 CAPSULE | Refills: 1 | Status: SHIPPED | OUTPATIENT
Start: 2025-07-22

## 2025-07-22 NOTE — PROGRESS NOTES
This evaluation was conducted via Teams using secure and encrypted videoconferencing technology. The patient was in their home in the [facility].    The patient's identity was confirmed and verbal consent was obtained for this virtual visit.      PSYCHIATRY FOLLOW-UP NOTE      Name: Nav Coates  MRN: [id number]  : 1980  Age: 45 y.o.  Date of assessment: 2025  PCP: Sarah Ramírez M.D.  Persons in attendance: Patient      REASON FOR VISIT/CHIEF COMPLAINT (as stated by Patient):  Nav Coates is a 45 y.o., Black or  female, attending follow-up appointment for mood and anxiety management.      HISTORY OF PRESENT ILLNESS:  Nav Coates is a 45 y.o. old female with MDD, NANCY and PTSD comes in today for follow up. Patient was last seen 3 months ago, and following treatment planning recommendations were done:  Continue Zoloft 100 mg daily X 1 more week --> 150 mg daily  Continue Buspar 15 mg BID  Continue Melatonin HS PRN for sleep.  Consider psychotherapy (EMDR based).    History of Present Illness    She discontinued Zoloft & Buspar and is on Prozac 20 mg daily with good response and better toleration.  She has been attending therapy sessions at [facility] for the past 2 months, which she reports as beneficial.  She believes an increase in her fluoxetine dosage might be beneficial as she continues to experience feelings of sadness. She reports residual anxiety, particularly in the mornings and occasionally in the afternoons during work hours. Her anxiety intensifies while driving, leading her to limit her driving time and rely on her  for transportation.           CURRENT MEDICATIONS:  Current Medications[1]    MEDICAL HISTORY  Past Medical History[2]  Past Surgical History[3]      PAST PSYCHIATRIC MEDICATIONS  Prozac, Zoloft, Celexa, Lexapro  Buspar, Propranolol, Klonopin      REVIEW OF SYSTEMS:        Constitutional negative   Eyes negative    Ears/Nose/Mouth/Throat negative   Cardiovascular  HLD   Respiratory negative   Gastrointestinal negative    Genitourinary negative   Muscular  Chronic back pain   Integumentary negative   Neurological negative   Endocrine negative   Hematologic/Lymphatic negative     PHYSICAL EXAMINAION:  Vital signs: There were no vitals taken for this visit.  Musculoskeletal: Normal gait.   Abnormal movements: none      MENTAL STATUS EXAMINATION      General:   - Grooming and hygiene: Casual,   - Apparent distress: none,   - Behavior: Calm  - Eye Contact:  Good,   - no psychomotor agitation or retardation    - Participation: Active verbal participation  Orientation: Alert and Fully Oriented to person, place and time  Mood: Depressed  Affect: Constricted,  Thought Process: Logical and Goal-directed  Thought Content: Denies suicidal or homicidal ideations, intent or plan   Perception: Denies auditory or visual hallucinations. No delusions noted   Attention span and concentration: Intact   Speech:Rate within normal limits and Volume within normal limits  Language: Appropriate   Insight: Good  Judgment: Good  Recent and remote memory: No gross evidence of memory deficits        DEPRESSION SCREENIN/2/2025    10:20 AM 2025     9:00 AM 3/20/2025     2:00 PM   Depression Screen (PHQ-2/PHQ-9)   PHQ-2 Total Score 6 6 6   PHQ-9 Total Score 16 25 21       Interpretation of PHQ-9 Total Score   Score Severity   1-4 No Depression   5-9 Mild Depression   10-14 Moderate Depression   15-19 Moderately Severe Depression   20-27 Severe Depression    CURRENT RISK:       Suicidal: Low       Homicidal: Low       Self-Harm: Low       Relapse: Low       Crisis Safety Plan Reviewed Not Indicated       If evidence of imminent risk is present, intervention/plan:      MEDICAL RECORDS/LABS/DIAGNOSTIC TESTS REVIEWED:  No new lab since last visit     [place]  records -   Reviewed     (1) MDD; (2) NANCY; (3) PTSD  Slow improvement.  Increase Prozac 40  mg daily  Continue Melatonin HS PRN for sleep.  Consider psychotherapy (EMDR based).  Medication options, alternatives (including no medications) and medication risks/benefits/side effects were discussed in detail.  Explained importance of contraceptive measures while on psychotropic medications, educated to let provider know if ever pregnant or wanting to become pregnant. Verbalized understanding.  The patient was advised to call, message provider on MyChart, or come in to the clinic if symptoms worsen or if any future questions/issues regarding their medications arise; the patient verbalized understanding and agreement.  The patient was educated to call 911, call the suicide hotline, or go to local ER if having thoughts of suicide or homicide; verbalized understanding.      Billing Coding based on:  153[address] based on MDM    Return to clinic in 6-8 weeks or sooner if symptoms worsen.  Next Appointment: instruction provided on how to make the next appointment.     The proposed treatment plan was discussed with the patient who was provided the opportunity to ask questions and make suggestions regarding alternative treatment. Patient verbalized understanding and expressed agreement with the plan.       Brant Bashir M.D.  07/22/25    This note was created using voice recognition software (Dragon). The accuracy of the dictation is limited by the abilities of the software. I have reviewed the note prior to signing, however some errors in grammar and context are still possible. If you have any questions related to this note please do not hesitate to contact our office.            [1]   Current Outpatient Medications   Medication Sig Dispense Refill    FLUoxetine (PROZAC) 20 MG Cap Take 1 Capsule by mouth every day. 90 Capsule 0    sertraline (ZOLOFT) 25 MG tablet Take 3 Tablets by mouth every day for 14 days, THEN 2 Tablets every day for 14 days, THEN 1 Tablet every day for 14 days. 84 Tablet 0    busPIRone (BUSPAR) 15  MG tablet Take 1 Tablet by mouth 2 times a day. 180 Tablet 1    albuterol 108 (90 Base) MCG/ACT Aero Soln inhalation aerosol Inhale 2 Puffs every four hours as needed for Shortness of Breath. 25.5 Each 0    ADVAIR DISKUS 250-50 MCG/DOSE AEROSOL POWDER, BREATH ACTIVATED TAKE 1 PUFF BY MOUTH EVERY 12 HOURS 60 Each 3    albuterol (PROVENTIL) 2.5mg/3ml Nebu Soln solution for nebulization 3 mL by Nebulization route every four hours as needed for Shortness of Breath. 30 Bullet 2     No current facility-administered medications for this visit.   [2] No past medical history on file.  [3]   Past Surgical History:  Procedure Laterality Date    CARPAL TUNNEL RELEASE Bilateral 2019 September, October    SALPINGOSTOMY  2006    ectopic pregnancy    PRIMARY C SECTION Bilateral

## 2025-07-23 DIAGNOSIS — I83.813 VARICOSE VEINS OF BILATERAL LOWER EXTREMITIES WITH PAIN: Primary | ICD-10-CM

## 2025-07-23 NOTE — PROGRESS NOTES
Vascular surgery declined the referral. They recommended the referral go to Dr. Mejía. New referral ordered.

## 2025-07-23 NOTE — Clinical Note
REFERRAL APPROVAL NOTICE         Sent on July 23, 2025                   KathleenAriella Coates  [address]  [address]  [facility] [address]                   Dear Ms. Coates,    After a careful review of the medical information and benefit coverage, [facility] has processed your referral. See below for additional details.    If applicable, you must be actively enrolled with your insurance for coverage of the authorized service. If you have any questions regarding your coverage, please contact your insurance directly.    REFERRAL INFORMATION   Referral #:  [id number]  Referred-To Department    Referred-By Provider:  Vascular Surgery    Sarah Ramírez M.D.   Department Of Surgery      [address]  [facility] [address]  [phone] [address], [address]  [facility] [id number] [phone]    Referral Start Date:  07/14/2025  Referral End Date:   07/15/2026             SCHEDULING  If you do not already have an appointment, please call [phone] to make an appointment.     MORE INFORMATION  If you do not already have a [email or web address] account, sign up at: [email or web address]  You can access your medical information, make appointments, see lab results, billing information, and more.  If you have questions regarding this referral, please contact  the [facility] Referrals department at:             [phone]. Monday - Friday 8:00AM - 5:00PM.     Sincerely,    [facility]